# Patient Record
Sex: MALE | Race: WHITE
[De-identification: names, ages, dates, MRNs, and addresses within clinical notes are randomized per-mention and may not be internally consistent; named-entity substitution may affect disease eponyms.]

---

## 2018-04-23 ENCOUNTER — APPOINTMENT (OUTPATIENT)
Dept: FAMILY MEDICINE | Facility: CLINIC | Age: 78
End: 2018-04-23
Payer: MEDICARE

## 2018-04-23 VITALS
SYSTOLIC BLOOD PRESSURE: 102 MMHG | DIASTOLIC BLOOD PRESSURE: 60 MMHG | HEIGHT: 74 IN | HEART RATE: 90 BPM | OXYGEN SATURATION: 92 % | WEIGHT: 315 LBS | BODY MASS INDEX: 40.43 KG/M2

## 2018-04-23 DIAGNOSIS — Z00.00 ENCOUNTER FOR GENERAL ADULT MEDICAL EXAMINATION W/OUT ABNORMAL FINDINGS: ICD-10-CM

## 2018-04-23 DIAGNOSIS — Z13.6 ENCOUNTER FOR SCREENING FOR CARDIOVASCULAR DISORDERS: ICD-10-CM

## 2018-04-23 DIAGNOSIS — I87.2 VENOUS INSUFFICIENCY (CHRONIC) (PERIPHERAL): ICD-10-CM

## 2018-04-23 DIAGNOSIS — Z13.29 ENCOUNTER FOR SCREENING FOR OTHER SUSPECTED ENDOCRINE DISORDER: ICD-10-CM

## 2018-04-23 DIAGNOSIS — J44.9 CHRONIC OBSTRUCTIVE PULMONARY DISEASE, UNSPECIFIED: ICD-10-CM

## 2018-04-23 DIAGNOSIS — Z13.220 ENCOUNTER FOR SCREENING FOR LIPOID DISORDERS: ICD-10-CM

## 2018-04-23 DIAGNOSIS — Z13.228 ENCOUNTER FOR SCREENING FOR OTHER SUSPECTED ENDOCRINE DISORDER: ICD-10-CM

## 2018-04-23 DIAGNOSIS — R26.9 UNSPECIFIED ABNORMALITIES OF GAIT AND MOBILITY: ICD-10-CM

## 2018-04-23 DIAGNOSIS — Z13.0 ENCOUNTER FOR SCREENING FOR OTHER SUSPECTED ENDOCRINE DISORDER: ICD-10-CM

## 2018-04-23 PROCEDURE — 99204 OFFICE O/P NEW MOD 45 MIN: CPT | Mod: 25

## 2018-04-23 PROCEDURE — 36415 COLL VENOUS BLD VENIPUNCTURE: CPT

## 2018-04-24 LAB
25(OH)D3 SERPL-MCNC: 39.2 NG/ML
ALBUMIN SERPL ELPH-MCNC: 3.6 G/DL
ALP BLD-CCNC: 94 U/L
ALT SERPL-CCNC: 16 U/L
ANION GAP SERPL CALC-SCNC: 15 MMOL/L
AST SERPL-CCNC: 27 U/L
BASOPHILS # BLD AUTO: 0.02 K/UL
BASOPHILS NFR BLD AUTO: 0.3 %
BILIRUB SERPL-MCNC: 0.5 MG/DL
BUN SERPL-MCNC: 30 MG/DL
CALCIUM SERPL-MCNC: 10 MG/DL
CHLORIDE SERPL-SCNC: 111 MMOL/L
CHOLEST SERPL-MCNC: 136 MG/DL
CHOLEST/HDLC SERPL: 2.3 RATIO
CO2 SERPL-SCNC: 24 MMOL/L
CREAT SERPL-MCNC: 1.32 MG/DL
EOSINOPHIL # BLD AUTO: 0.13 K/UL
EOSINOPHIL NFR BLD AUTO: 2.2 %
FERRITIN SERPL-MCNC: 73 NG/ML
GLUCOSE SERPL-MCNC: 73 MG/DL
HBA1C MFR BLD HPLC: 5.8 %
HCT VFR BLD CALC: 43.5 %
HDLC SERPL-MCNC: 60 MG/DL
HGB BLD-MCNC: 13.6 G/DL
IMM GRANULOCYTES NFR BLD AUTO: 0.2 %
IRON SATN MFR SERPL: 27 %
IRON SERPL-MCNC: 76 UG/DL
LDLC SERPL CALC-MCNC: 58 MG/DL
LYMPHOCYTES # BLD AUTO: 1.32 K/UL
LYMPHOCYTES NFR BLD AUTO: 22.6 %
MAN DIFF?: NORMAL
MCHC RBC-ENTMCNC: 30.2 PG
MCHC RBC-ENTMCNC: 31.3 GM/DL
MCV RBC AUTO: 96.7 FL
MONOCYTES # BLD AUTO: 0.61 K/UL
MONOCYTES NFR BLD AUTO: 10.4 %
NEUTROPHILS # BLD AUTO: 3.75 K/UL
NEUTROPHILS NFR BLD AUTO: 64.3 %
PLATELET # BLD AUTO: 154 K/UL
POTASSIUM SERPL-SCNC: 5.6 MMOL/L
PROT SERPL-MCNC: 6.7 G/DL
RBC # BLD: 4.5 M/UL
RBC # FLD: 16.2 %
SODIUM SERPL-SCNC: 150 MMOL/L
T4 SERPL-MCNC: 5.3 UG/DL
TIBC SERPL-MCNC: 281 UG/DL
TRIGL SERPL-MCNC: 91 MG/DL
TSH SERPL-ACNC: 9.3 UIU/ML
UIBC SERPL-MCNC: 205 UG/DL
URATE SERPL-MCNC: 9.4 MG/DL
VIT B12 SERPL-MCNC: 518 PG/ML
WBC # FLD AUTO: 5.84 K/UL

## 2018-04-24 RX ORDER — CHOLECALCIFEROL (VITAMIN D3) 50 MCG
50 MCG TABLET ORAL
Refills: 0 | Status: ACTIVE | COMMUNITY

## 2018-04-24 RX ORDER — ASPIRIN 325 MG/1
325 TABLET, FILM COATED ORAL DAILY
Refills: 0 | Status: ACTIVE | COMMUNITY

## 2018-04-24 RX ORDER — SIMVASTATIN 40 MG/1
40 TABLET, FILM COATED ORAL
Refills: 0 | Status: ACTIVE | COMMUNITY

## 2018-04-24 RX ORDER — MULTIVIT-MIN/FA/LYCOPEN/LUTEIN .4-300-25
TABLET ORAL
Refills: 0 | Status: ACTIVE | COMMUNITY

## 2018-04-27 ENCOUNTER — APPOINTMENT (OUTPATIENT)
Dept: CARDIOLOGY | Facility: CLINIC | Age: 78
End: 2018-04-27
Payer: MEDICARE

## 2018-04-27 ENCOUNTER — NON-APPOINTMENT (OUTPATIENT)
Age: 78
End: 2018-04-27

## 2018-04-27 VITALS
OXYGEN SATURATION: 93 % | BODY MASS INDEX: 40.43 KG/M2 | WEIGHT: 315 LBS | DIASTOLIC BLOOD PRESSURE: 77 MMHG | HEIGHT: 74 IN | SYSTOLIC BLOOD PRESSURE: 100 MMHG | HEART RATE: 109 BPM

## 2018-04-27 DIAGNOSIS — I10 ESSENTIAL (PRIMARY) HYPERTENSION: ICD-10-CM

## 2018-04-27 DIAGNOSIS — I48.91 UNSPECIFIED ATRIAL FIBRILLATION: ICD-10-CM

## 2018-04-27 DIAGNOSIS — R60.0 LOCALIZED EDEMA: ICD-10-CM

## 2018-04-27 DIAGNOSIS — I50.9 HEART FAILURE, UNSPECIFIED: ICD-10-CM

## 2018-04-27 PROCEDURE — 93000 ELECTROCARDIOGRAM COMPLETE: CPT

## 2018-04-27 PROCEDURE — 99205 OFFICE O/P NEW HI 60 MIN: CPT | Mod: 25

## 2018-04-27 RX ORDER — TORSEMIDE 10 MG/1
10 TABLET ORAL DAILY
Qty: 90 | Refills: 3 | Status: ACTIVE | COMMUNITY
Start: 1900-01-01 | End: 1900-01-01

## 2018-04-27 RX ORDER — METOPROLOL SUCCINATE 200 MG/1
200 TABLET, EXTENDED RELEASE ORAL DAILY
Qty: 90 | Refills: 3 | Status: ACTIVE | COMMUNITY
Start: 1900-01-01 | End: 1900-01-01

## 2018-04-28 ENCOUNTER — INPATIENT (INPATIENT)
Facility: HOSPITAL | Age: 78
LOS: 6 days | End: 2018-05-05
Attending: FAMILY MEDICINE | Admitting: FAMILY MEDICINE
Payer: MEDICARE

## 2018-04-28 VITALS — WEIGHT: 315 LBS | HEIGHT: 74 IN

## 2018-04-28 DIAGNOSIS — N17.0 ACUTE KIDNEY FAILURE WITH TUBULAR NECROSIS: ICD-10-CM

## 2018-04-28 DIAGNOSIS — I50.21 ACUTE SYSTOLIC (CONGESTIVE) HEART FAILURE: ICD-10-CM

## 2018-04-28 DIAGNOSIS — N17.9 ACUTE KIDNEY FAILURE, UNSPECIFIED: ICD-10-CM

## 2018-04-28 DIAGNOSIS — E87.0 HYPEROSMOLALITY AND HYPERNATREMIA: ICD-10-CM

## 2018-04-28 DIAGNOSIS — J44.9 CHRONIC OBSTRUCTIVE PULMONARY DISEASE, UNSPECIFIED: ICD-10-CM

## 2018-04-28 DIAGNOSIS — K72.90 HEPATIC FAILURE, UNSPECIFIED WITHOUT COMA: ICD-10-CM

## 2018-04-28 DIAGNOSIS — E66.9 OBESITY, UNSPECIFIED: ICD-10-CM

## 2018-04-28 DIAGNOSIS — I25.10 ATHEROSCLEROTIC HEART DISEASE OF NATIVE CORONARY ARTERY WITHOUT ANGINA PECTORIS: ICD-10-CM

## 2018-04-28 DIAGNOSIS — I11.0 HYPERTENSIVE HEART DISEASE WITH HEART FAILURE: ICD-10-CM

## 2018-04-28 DIAGNOSIS — A41.9 SEPSIS, UNSPECIFIED ORGANISM: ICD-10-CM

## 2018-04-28 DIAGNOSIS — K72.00 ACUTE AND SUBACUTE HEPATIC FAILURE WITHOUT COMA: ICD-10-CM

## 2018-04-28 DIAGNOSIS — E83.51 HYPOCALCEMIA: ICD-10-CM

## 2018-04-28 DIAGNOSIS — E78.5 HYPERLIPIDEMIA, UNSPECIFIED: ICD-10-CM

## 2018-04-28 DIAGNOSIS — I95.9 HYPOTENSION, UNSPECIFIED: ICD-10-CM

## 2018-04-28 DIAGNOSIS — I48.2 CHRONIC ATRIAL FIBRILLATION: ICD-10-CM

## 2018-04-28 DIAGNOSIS — Z79.82 LONG TERM (CURRENT) USE OF ASPIRIN: ICD-10-CM

## 2018-04-28 DIAGNOSIS — Z87.891 PERSONAL HISTORY OF NICOTINE DEPENDENCE: ICD-10-CM

## 2018-04-28 DIAGNOSIS — E87.2 ACIDOSIS: ICD-10-CM

## 2018-04-28 DIAGNOSIS — G93.41 METABOLIC ENCEPHALOPATHY: ICD-10-CM

## 2018-04-28 DIAGNOSIS — R65.21 SEVERE SEPSIS WITH SEPTIC SHOCK: ICD-10-CM

## 2018-04-28 LAB
BASOPHILS # BLD AUTO: 0.03 K/UL — SIGNIFICANT CHANGE UP (ref 0–0.2)
BASOPHILS NFR BLD AUTO: 0.5 % — SIGNIFICANT CHANGE UP (ref 0–2)
EOSINOPHIL # BLD AUTO: 0.09 K/UL — SIGNIFICANT CHANGE UP (ref 0–0.5)
EOSINOPHIL NFR BLD AUTO: 1.6 % — SIGNIFICANT CHANGE UP (ref 0–6)
HCT VFR BLD CALC: 45.7 % — SIGNIFICANT CHANGE UP (ref 39–50)
HGB BLD-MCNC: 14.2 G/DL — SIGNIFICANT CHANGE UP (ref 13–17)
IMM GRANULOCYTES NFR BLD AUTO: 0.2 % — SIGNIFICANT CHANGE UP (ref 0–1.5)
INR BLD: 1.01 RATIO — SIGNIFICANT CHANGE UP (ref 0.88–1.16)
LYMPHOCYTES # BLD AUTO: 1.07 K/UL — SIGNIFICANT CHANGE UP (ref 1–3.3)
LYMPHOCYTES # BLD AUTO: 19 % — SIGNIFICANT CHANGE UP (ref 13–44)
MCHC RBC-ENTMCNC: 29.3 PG — SIGNIFICANT CHANGE UP (ref 27–34)
MCHC RBC-ENTMCNC: 31.1 GM/DL — LOW (ref 32–36)
MCV RBC AUTO: 94.4 FL — SIGNIFICANT CHANGE UP (ref 80–100)
MONOCYTES # BLD AUTO: 0.62 K/UL — SIGNIFICANT CHANGE UP (ref 0–0.9)
MONOCYTES NFR BLD AUTO: 11 % — SIGNIFICANT CHANGE UP (ref 2–14)
NEUTROPHILS # BLD AUTO: 3.82 K/UL — SIGNIFICANT CHANGE UP (ref 1.8–7.4)
NEUTROPHILS NFR BLD AUTO: 67.7 % — SIGNIFICANT CHANGE UP (ref 43–77)
NRBC # BLD: 0 /100 WBCS — SIGNIFICANT CHANGE UP (ref 0–0)
PLATELET # BLD AUTO: 147 K/UL — LOW (ref 150–400)
PROTHROM AB SERPL-ACNC: 10.9 SEC — SIGNIFICANT CHANGE UP (ref 9.8–12.7)
RBC # BLD: 4.84 M/UL — SIGNIFICANT CHANGE UP (ref 4.2–5.8)
RBC # FLD: 15.4 % — HIGH (ref 10.3–14.5)
TROPONIN I SERPL-MCNC: <0.015 NG/ML — SIGNIFICANT CHANGE UP (ref 0.01–0.04)
WBC # BLD: 5.64 K/UL — SIGNIFICANT CHANGE UP (ref 3.8–10.5)
WBC # FLD AUTO: 5.64 K/UL — SIGNIFICANT CHANGE UP (ref 3.8–10.5)

## 2018-04-28 PROCEDURE — 71045 X-RAY EXAM CHEST 1 VIEW: CPT | Mod: 26

## 2018-04-28 PROCEDURE — 99291 CRITICAL CARE FIRST HOUR: CPT

## 2018-04-28 PROCEDURE — 93010 ELECTROCARDIOGRAM REPORT: CPT

## 2018-04-28 RX ORDER — METOPROLOL TARTRATE 50 MG
5 TABLET ORAL ONCE
Qty: 0 | Refills: 0 | Status: COMPLETED | OUTPATIENT
Start: 2018-04-28 | End: 2018-04-28

## 2018-04-28 RX ORDER — NITROGLYCERIN 6.5 MG
0.5 CAPSULE, EXTENDED RELEASE ORAL ONCE
Qty: 0 | Refills: 0 | Status: COMPLETED | OUTPATIENT
Start: 2018-04-28 | End: 2018-04-28

## 2018-04-28 RX ORDER — FUROSEMIDE 40 MG
40 TABLET ORAL ONCE
Qty: 0 | Refills: 0 | Status: COMPLETED | OUTPATIENT
Start: 2018-04-28 | End: 2018-04-28

## 2018-04-28 RX ADMIN — Medication 125 MILLIGRAM(S): at 22:11

## 2018-04-28 RX ADMIN — Medication 5 MILLIGRAM(S): at 22:11

## 2018-04-28 NOTE — ED STATDOCS - PROGRESS NOTE DETAILS
Cristian ANDERSON for ED attending, Dr. Wilder: 78 y/o F with a PMHx of afib presents to the ED c/o SOB xmonths, worsened this week. +b/l leg swelling. No CP. On ASA 325mg daily. Will send pt to the main ED for further evaluation.

## 2018-04-28 NOTE — ED PROVIDER NOTE - CARE PLAN
Principal Discharge DX:	CHF (congestive heart failure), NYHA class I, acute, systolic  Secondary Diagnosis:	Rapid atrial fibrillation

## 2018-04-28 NOTE — ED PROVIDER NOTE - MUSCULOSKELETAL, MLM
Spine appears normal, range of motion is not limited, no muscle or joint tenderness 2+ pitting edema, erryhtem and scaleySpine appears normal, range of motion is not limited, no muscle or joint tenderness 2+ pitting edema, erythema and scalenes to b/l LE's. Spine appears normal, range of motion is not limited, no muscle or joint tenderness

## 2018-04-28 NOTE — ED ADULT NURSE NOTE - OBJECTIVE STATEMENT
Pt presents to the ED with complaints of worsening shortness of breath. Pt was seen by his cardiologist and told to come to the ED. Pt stopped taking Furosemide a few weeks ago as he felt he was having bladder issues. Pt states that his is unable to ambulate without exertion. Pt has a h/o afib as well. Pt has increased edema to the lower extremities bilaterally.

## 2018-04-28 NOTE — ED PROVIDER NOTE - OBJECTIVE STATEMENT
76 y/o male w. PMHx of ?CHF,?COPD,afib, HTN, presents to the ED c/o. Report progressively worsening SOB over the past couple of weeks +fatigue +b/l leg swelling/weakness + brain fogginess. Pt is on at home nebulizer ,states provides no relief. Saw cardiologist  yesterday, which prompted him to come into ED. Denies cough, fever, CP or any other acute medical complaints at this time.  Pt is on daily ASA. 78 y/o male w. PMHx of ?CHF,?COPD, Afib, HTN, presents to the ED c/o. Report progressively worsening SOB over the past couple of weeks +fatigue +b/l leg swelling/weakness + brain fogginess. Pt is on at home nebulizer ,states provides no relief. Saw cardiologist  yesterday, which prompted him to come into ED. Denies cough, fever, CP or any other acute medical complaints at this time.  Pt is on daily ASA.

## 2018-04-28 NOTE — ED PROVIDER NOTE - CRITICAL CARE PROVIDED
direct patient care (not related to procedure)/consult w/ pt's family directly relating to pts condition/interpretation of diagnostic studies/consultation with other physicians

## 2018-04-28 NOTE — ED ADULT TRIAGE NOTE - CHIEF COMPLAINT QUOTE
Pt. to the ED BIBA C/O SOB- As per family , pt. was sent to the ED by cardiologist for evaluation of possible HF- Pt. denies CP at this time-

## 2018-04-29 LAB
NT-PROBNP SERPL-SCNC: 5002 PG/ML — HIGH (ref 0–450)
TROPONIN I SERPL-MCNC: <0.015 NG/ML — SIGNIFICANT CHANGE UP (ref 0.01–0.04)

## 2018-04-29 RX ORDER — METOPROLOL TARTRATE 50 MG
50 TABLET ORAL
Qty: 0 | Refills: 0 | Status: DISCONTINUED | OUTPATIENT
Start: 2018-04-29 | End: 2018-05-01

## 2018-04-29 RX ORDER — SODIUM CHLORIDE 9 MG/ML
250 INJECTION INTRAMUSCULAR; INTRAVENOUS; SUBCUTANEOUS ONCE
Qty: 0 | Refills: 0 | Status: COMPLETED | OUTPATIENT
Start: 2018-04-29 | End: 2018-04-29

## 2018-04-29 RX ORDER — ENOXAPARIN SODIUM 100 MG/ML
40 INJECTION SUBCUTANEOUS EVERY 24 HOURS
Qty: 0 | Refills: 0 | Status: DISCONTINUED | OUTPATIENT
Start: 2018-04-29 | End: 2018-05-02

## 2018-04-29 RX ORDER — FUROSEMIDE 40 MG
40 TABLET ORAL
Qty: 0 | Refills: 0 | Status: DISCONTINUED | OUTPATIENT
Start: 2018-04-29 | End: 2018-05-01

## 2018-04-29 RX ORDER — ASPIRIN/CALCIUM CARB/MAGNESIUM 324 MG
325 TABLET ORAL DAILY
Qty: 0 | Refills: 0 | Status: DISCONTINUED | OUTPATIENT
Start: 2018-04-29 | End: 2018-05-05

## 2018-04-29 RX ORDER — SIMVASTATIN 20 MG/1
40 TABLET, FILM COATED ORAL AT BEDTIME
Qty: 0 | Refills: 0 | Status: DISCONTINUED | OUTPATIENT
Start: 2018-04-29 | End: 2018-05-05

## 2018-04-29 RX ADMIN — Medication 40 MILLIGRAM(S): at 17:43

## 2018-04-29 RX ADMIN — Medication 40 MILLIGRAM(S): at 11:41

## 2018-04-29 RX ADMIN — Medication 325 MILLIGRAM(S): at 11:42

## 2018-04-29 RX ADMIN — Medication 50 MILLIGRAM(S): at 17:33

## 2018-04-29 RX ADMIN — ENOXAPARIN SODIUM 40 MILLIGRAM(S): 100 INJECTION SUBCUTANEOUS at 12:00

## 2018-04-29 RX ADMIN — SODIUM CHLORIDE 250 MILLILITER(S): 9 INJECTION INTRAMUSCULAR; INTRAVENOUS; SUBCUTANEOUS at 00:54

## 2018-04-29 RX ADMIN — Medication 0.5 INCH(S): at 00:08

## 2018-04-29 RX ADMIN — SIMVASTATIN 40 MILLIGRAM(S): 20 TABLET, FILM COATED ORAL at 21:27

## 2018-04-29 NOTE — H&P ADULT - ASSESSMENT
76 y/o male w. PMHx of ?CHF, COPD, chr Afib not on AC due to GI bleed and hemoptysis, HTN, presented to the ED with progressively worsening SOB over the past couple of weeks. As per patient, his SOB progressively worsening and gradually gaining weight with leg swelling. He gained almost 30 lbs over couple of weeks. He denies any chest pain, palpitation, nausea, vomiting, abd pain.   He received 40 mg IV lasix, 125 mg IV solumedrol and 5 mg IV lasix in ED.      1. Acute CHF-unknown EF  Admit to tele  Daily weight, fluid restrictions  Strict I/Os  Continue IV lasix  Check 2D echo  Consult cardiology-Dr Castillo.    2. Chr A Fib with RVR  Continue lopressor and aspirin  Patient was on xarelto in the past, off now due to GI bleed and hemoptysis.    3. HTN-  Continue lopressor    4. Hyperlipidmeia-  Continue  statin    5. Hypernatremia-  Follow BMP    6. DVT prophylaxis.

## 2018-04-29 NOTE — ED ADULT NURSE REASSESSMENT NOTE - NS ED NURSE REASSESS COMMENT FT1
Pt and family updated in regards to plan for admission, admission rationale and process. Pt and family verbalize understanding of information. Pt depends changed and pt repositioned for comfort. Pt remains on bedside monitor at this time with call bell within reach. Pt and family have no questions at this time, awaiting hospitalist evaluation and admission orders. Will continue to monitor.
Pt and family updated in regards to plan of care and medication information and rationale. Pt and family verbalized understanding of information and have no questions at this time. Pt remains on bedside monitor with call bell within reach. Pt and family updated in regards to results at this time and verbalized understanding of information. Will continue to monitor.
Patient received from CARIN Meyer. patient resting comfortably in bed. Safety and comfort maintained. Will continue to monitor.

## 2018-04-29 NOTE — H&P ADULT - NSHPLABSRESULTS_GEN_ALL_CORE
EKG: A fib with RVR                          14.2   5.64  )-----------( 147      ( 28 Apr 2018 19:58 )             45.7     28 Apr 2018 19:58    148    |  114    |  28     ----------------------------<  80     4.5     |  25     |  1.25     Ca    9.6        28 Apr 2018 19:58  Mg     2.3       28 Apr 2018 19:58    TPro  7.1    /  Alb  3.2    /  TBili  0.6    /  DBili  x      /  AST  26     /  ALT  22     /  AlkPhos  104    28 Apr 2018 19:58    LIVER FUNCTIONS - ( 28 Apr 2018 19:58 )  Alb: 3.2 g/dL / Pro: 7.1 gm/dL / ALK PHOS: 104 U/L / ALT: 22 U/L / AST: 26 U/L / GGT: x           PT/INR - ( 28 Apr 2018 19:58 )   PT: 10.9 sec;   INR: 1.01 ratio           CAPILLARY BLOOD GLUCOSE        CARDIAC MARKERS ( 28 Apr 2018 23:33 )  <0.015 ng/mL / x     / x     / x     / x      CARDIAC MARKERS ( 28 Apr 2018 19:58 )  <0.015 ng/mL / x     / x     / x     / x

## 2018-04-29 NOTE — H&P ADULT - NSHPPHYSICALEXAM_GEN_ALL_CORE
Vital Signs Last 24 Hrs  T(C): 36.6 (29 Apr 2018 06:11), Max: 36.8 (29 Apr 2018 00:15)  T(F): 97.9 (29 Apr 2018 06:11), Max: 98.3 (29 Apr 2018 00:15)  HR: 96 (29 Apr 2018 02:20) (96 - 128)  BP: 112/83 (29 Apr 2018 06:11) (93/79 - 134/93)  BP(mean): --  RR: 20 (29 Apr 2018 06:11) (15 - 20)  SpO2: 95% (29 Apr 2018 06:11) (94% - 97%)          · CONSTITUTIONAL: Well appearing, well nourished, awake, alert, oriented to person, place, time/situation and in no apparent distress.  · ENMT: Airway patent, Nasal mucosa clear. Mouth with normal mucosa. Throat has no vesicles, no oropharyngeal exudates and uvula is midline.  · EYES: Clear bilaterally, pupils equal, round and reactive to light.  · CARDIAC: +JVD, tachycardic, rapid, irregular  · RESPIRATORY: b/l rhonchi.  · GASTROINTESTINAL: Abdomen soft, non-tender, no guarding.  · MUSCULOSKELETAL: 2+ pitting edema, erythema and scalenes to b/l LE's. Spine appears normal, range of motion is not limited, no muscle or joint tenderness  · NEUROLOGICAL: Alert and oriented, no focal deficits, no motor or sensory deficits.  · SKIN: Skin normal color for race, warm, dry and intact. No evidence of rash.  · PSYCHIATRIC: Alert and oriented to person, place, time/situation. normal mood and affect. no apparent risk to self or others.  · HEME LYMPH: No adenopathy or splenomegaly. No cervical or inguinal lymphadenopathy.

## 2018-04-29 NOTE — H&P ADULT - HISTORY OF PRESENT ILLNESS
76 y/o male w. PMHx of ?CHF,?COPD, Afib, HTN, presents to the ED c/o. Report progressively worsening SOB over the past couple of weeks +fatigue +b/l leg swelling/weakness + brain fogginess. Pt is on at home nebulizer ,states provides no relief. Saw cardiologist  yesterday, which prompted him to come into ED. Denies cough, fever, CP                     76 y/o male w. PMHx of ?CHF, COPD, chr Afib not on AC due to GI bleed and hemoptysis, HTN, presented to the ED with progressively worsening SOB over the past couple of weeks. As per patient, his SOB progressively worsening and gradually gaining weight with leg swelling. He gained almost 30 lbs over couple of weeks. He denies any chest pain, palpitation, nausea, vomiting, abd pain.   He received 40 mg IV lasix, 125 mg IV solumedrol and 5 mg IV lasix in ED. 78 y/o male w. PMHx of ?CHF, COPD, chr Afib not on AC due to GI bleed and hemoptysis, HTN, presented to the ED with progressively worsening SOB over the past couple of weeks. As per patient, his SOB progressively worsening and gradually gaining weight with leg swelling. He gained almost 30 lbs over couple of weeks. He denies any chest pain, palpitation, nausea, vomiting, abd pain.   He received 40 mg IV lasix, 125 mg IV solumedrol and 5 mg IV lasix in ED.

## 2018-04-30 LAB
ANION GAP SERPL CALC-SCNC: 8 MMOL/L — SIGNIFICANT CHANGE UP (ref 5–17)
BUN SERPL-MCNC: 31 MG/DL — HIGH (ref 7–23)
CALCIUM SERPL-MCNC: 8.9 MG/DL — SIGNIFICANT CHANGE UP (ref 8.5–10.1)
CHLORIDE SERPL-SCNC: 114 MMOL/L — HIGH (ref 96–108)
CO2 SERPL-SCNC: 26 MMOL/L — SIGNIFICANT CHANGE UP (ref 22–31)
CREAT SERPL-MCNC: 1.31 MG/DL — HIGH (ref 0.5–1.3)
GLUCOSE SERPL-MCNC: 108 MG/DL — HIGH (ref 70–99)
HCT VFR BLD CALC: 41.5 % — SIGNIFICANT CHANGE UP (ref 39–50)
HGB BLD-MCNC: 13.1 G/DL — SIGNIFICANT CHANGE UP (ref 13–17)
MCHC RBC-ENTMCNC: 29.8 PG — SIGNIFICANT CHANGE UP (ref 27–34)
MCHC RBC-ENTMCNC: 31.6 GM/DL — LOW (ref 32–36)
MCV RBC AUTO: 94.5 FL — SIGNIFICANT CHANGE UP (ref 80–100)
NRBC # BLD: 0 /100 WBCS — SIGNIFICANT CHANGE UP (ref 0–0)
PLATELET # BLD AUTO: 135 K/UL — LOW (ref 150–400)
POTASSIUM SERPL-MCNC: 4 MMOL/L — SIGNIFICANT CHANGE UP (ref 3.5–5.3)
POTASSIUM SERPL-SCNC: 4 MMOL/L — SIGNIFICANT CHANGE UP (ref 3.5–5.3)
RBC # BLD: 4.39 M/UL — SIGNIFICANT CHANGE UP (ref 4.2–5.8)
RBC # FLD: 15.6 % — HIGH (ref 10.3–14.5)
SODIUM SERPL-SCNC: 148 MMOL/L — HIGH (ref 135–145)
WBC # BLD: 9.02 K/UL — SIGNIFICANT CHANGE UP (ref 3.8–10.5)
WBC # FLD AUTO: 9.02 K/UL — SIGNIFICANT CHANGE UP (ref 3.8–10.5)

## 2018-04-30 PROCEDURE — 93306 TTE W/DOPPLER COMPLETE: CPT | Mod: 26

## 2018-04-30 RX ADMIN — Medication 50 MILLIGRAM(S): at 06:20

## 2018-04-30 RX ADMIN — SIMVASTATIN 40 MILLIGRAM(S): 20 TABLET, FILM COATED ORAL at 21:36

## 2018-04-30 RX ADMIN — Medication 40 MILLIGRAM(S): at 18:10

## 2018-04-30 RX ADMIN — Medication 325 MILLIGRAM(S): at 11:23

## 2018-04-30 RX ADMIN — Medication 40 MILLIGRAM(S): at 06:20

## 2018-04-30 RX ADMIN — ENOXAPARIN SODIUM 40 MILLIGRAM(S): 100 INJECTION SUBCUTANEOUS at 11:23

## 2018-04-30 RX ADMIN — Medication 50 MILLIGRAM(S): at 18:10

## 2018-04-30 NOTE — DIETITIAN INITIAL EVALUATION ADULT. - OTHER INFO
Nutrition Consult for CHF: 77yoM admitted for acute CHF exacerbation, hypernatremia. PMH includes chronic Afib, HTN, HLD. Pt reports no change in appetite, po intake meeting >80% estimated nutrition needs. No c/o n/v/d/c. NKFA. Pt receiving low sodium diet rx. Diet recall reflects excess sodium intake, +high fat foods. No therapeutic diet restrictions followed. +25# (8.3%) wt gain x6mo. likely almost entirely due to fluid retention. BMI 41.9, categorized as obese. d/w pt low sodium guideline, fluid restriction, monitoring wt daily. Patient consumes mostly prepared foods, take-out, son does the shopping. Provided recommendations for low sodium food products. Education materials provided. 3+ edema to R/L legs noted. Rajinder 14. Recommendations: 1) Change diet to DASH/TLC 2) Monitor weight daily 3) Add MVI/minerals to ensure 100% RDI met. Will continue to monitor po intake, wt, labs.

## 2018-04-30 NOTE — CONSULT NOTE ADULT - ASSESSMENT
76 y/o male w. PMHx of ?CHF, COPD, chr Afib not on AC due to GI bleed and hemoptysis, HTN, presented to the ED with progressively worsening SOB over the past couple of weeks. As per patient, his SOB progressively worsening and gradually gaining weight with leg swelling. He gained almost 30 lbs over couple of weeks.     1. Acute CHF exacerbation- pt is a poor historian, unknown LVFx now. Check 2Decho. Would reduce lasix to 40mg daily in light of rising Cr. Repeat CXR tomorrow AM. Daily weights, fluid restrictions  Strict I/Os. Likely poor dietary compliance as well. Cont Bbl. Trops negative. Outpt ischemic eval.    2. Chronic atrial fibrillation- presently rate controlled on tele. Cont Bbl- will titrate as needed. As per hospitalist note, patient was on xarelto in the past, off now due to GI bleed and hemoptysis.  Can continue ASA for some CVA proph.    3. HTN- controlled, cont current meds.    4. Dyslipidemia- cont statin, lipid panel pending.     5. DVT proph, OOB as tolerated.     6. Will need close outpt f/u upon discharge.

## 2018-04-30 NOTE — CONSULT NOTE ADULT - SUBJECTIVE AND OBJECTIVE BOX
Cardiology Consultation    HPI: 78 y/o male w. PMHx of ?CHF, COPD, chr Afib not on AC due to GI bleed and hemoptysis, HTN, presented to the ED with progressively worsening SOB over the past couple of weeks. As per patient, his SOB progressively worsening and gradually gaining weight with leg swelling. He gained almost 30 lbs over couple of weeks. He denies any chest pain, palpitation, nausea, vomiting, abd pain.   He received 40 mg IV lasix, 125 mg IV solumedrol and 5 mg IV lasix in ED. (29 Apr 2018 13:11)    4/30- SOB improved. Afib on tele- . No CP. No fevers.    PAST MEDICAL & SURGICAL HISTORY:  Chronic atrial fibrillation  HTN (hypertension), benign  No significant past surgical history    Allergies  No Known Allergies    SOCIAL HISTORY: Denies tobacco, etoh abuse or illicit drug use    FAMILY HISTORY: No pertinent family history in first degree relatives    MEDICATIONS  (STANDING):  aspirin 325 milliGRAM(s) Oral daily  enoxaparin Injectable 40 milliGRAM(s) SubCutaneous every 24 hours  furosemide   Injectable 40 milliGRAM(s) IV Push two times a day  metoprolol tartrate 50 milliGRAM(s) Oral two times a day  simvastatin 40 milliGRAM(s) Oral at bedtime    MEDICATIONS  (PRN):      Vital Signs Last 24 Hrs  T(C): 37.1 (30 Apr 2018 06:14), Max: 37.1 (30 Apr 2018 06:14)  T(F): 98.7 (30 Apr 2018 06:14), Max: 98.7 (30 Apr 2018 06:14)  HR: 86 (30 Apr 2018 06:14) (86 - 110)  BP: 115/84 (30 Apr 2018 06:14) (94/78 - 115/84)  BP(mean): --  RR: 18 (30 Apr 2018 06:14) (15 - 18)  SpO2: 94% (30 Apr 2018 06:14) (94% - 100%)    REVIEW OF SYSTEMS:    CONSTITUTIONAL:  As per HPI.  HEENT:  Eyes:  No diplopia or blurred vision. ENT:  No earache, sore throat or runny nose.  CARDIOVASCULAR:  No pressure, squeezing, strangling, tightness, heaviness or aching about the chest, neck, axilla or epigastrium.  RESPIRATORY: +SOB, DARLING  GASTROINTESTINAL:  No nausea, vomiting or diarrhea.  GENITOURINARY:  No dysuria, frequency or urgency.  MUSCULOSKELETAL:  As per HPI.  SKIN:  No change in skin, hair or nails.  NEUROLOGIC:  No paresthesias, fasciculations, seizures or weakness.    PHYSICAL EXAMINATION:    GENERAL APPEARANCE:  Pt. is not currently dyspneic, in no distress. Pt. is alert, oriented, and pleasant.  HEENT:  Pupils are normal and react normally. No icterus. Mucous membranes well colored.  NECK:  Supple. No lymphadenopathy. Jugular venous pressure not elevated. Carotids equal.   HEART:   The cardiac impulse has a normal quality. There are no murmurs, rubs or gallops noted, irregular rhythm  CHEST:  Decreased BS b/l  ABDOMEN:  Soft and nontender.   EXTREMITIES: 2-3+ LE edema.  SKIN:  b/l LE venous stasis changes.    I&O's Summary      LABS:                        13.1   9.02  )-----------( x        ( 30 Apr 2018 06:41 )             41.5     04-30    148<H>  |  114<H>  |  31<H>  ----------------------------<  108<H>  4.0   |  26  |  1.31<H>    Ca    8.9      30 Apr 2018 06:41  Mg     2.3     04-28    TPro  7.1  /  Alb  3.2<L>  /  TBili  0.6  /  DBili  x   /  AST  26  /  ALT  22  /  AlkPhos  104  04-28    LIVER FUNCTIONS - ( 28 Apr 2018 19:58 )  Alb: 3.2 g/dL / Pro: 7.1 gm/dL / ALK PHOS: 104 U/L / ALT: 22 U/L / AST: 26 U/L / GGT: x           PT/INR - ( 28 Apr 2018 19:58 )   PT: 10.9 sec;   INR: 1.01 ratio      CARDIAC MARKERS ( 28 Apr 2018 23:33 )  <0.015 ng/mL / x     / x     / x     / x      CARDIAC MARKERS ( 28 Apr 2018 19:58 )  <0.015 ng/mL / x     / x     / x     / x        EKG: < from: 12 Lead ECG (04.28.18 @ 19:31) >  Atrial fibrillation with rapid ventricular response with premature ventricular or aberrantly conducted complexes  Left axis deviation  Non-specific intra-ventricular conduction delay  Marked ST abnormality, possible lateral subendocardial injury    TELEMETRY: Afib     CARDIAC TESTS: pending    RADIOLOGY & ADDITIONAL STUDIES: < from: Xray Chest 1 View- PORTABLE-Routine (04.28.18 @ 20:31) >    Small bilateral pleural effusions with bibasilar atelectasis and or   pneumonia.    ASSESSMENT & PLAN:

## 2018-04-30 NOTE — DIETITIAN INITIAL EVALUATION ADULT. - ENERGY NEEDS
Ht.      72"        Wt.    325.8#             41.9 BMI                  IBW    178#               Pt is at    183%  IBW

## 2018-05-01 DIAGNOSIS — I50.21 ACUTE SYSTOLIC (CONGESTIVE) HEART FAILURE: ICD-10-CM

## 2018-05-01 DIAGNOSIS — I48.2 CHRONIC ATRIAL FIBRILLATION: ICD-10-CM

## 2018-05-01 LAB
ANION GAP SERPL CALC-SCNC: 3 MMOL/L — LOW (ref 5–17)
BUN SERPL-MCNC: 36 MG/DL — HIGH (ref 7–23)
CALCIUM SERPL-MCNC: 9.1 MG/DL — SIGNIFICANT CHANGE UP (ref 8.5–10.1)
CHLORIDE SERPL-SCNC: 110 MMOL/L — HIGH (ref 96–108)
CO2 SERPL-SCNC: 32 MMOL/L — HIGH (ref 22–31)
CREAT SERPL-MCNC: 1.41 MG/DL — HIGH (ref 0.5–1.3)
GLUCOSE SERPL-MCNC: 73 MG/DL — SIGNIFICANT CHANGE UP (ref 70–99)
HCT VFR BLD CALC: 42.4 % — SIGNIFICANT CHANGE UP (ref 39–50)
HGB BLD-MCNC: 13.2 G/DL — SIGNIFICANT CHANGE UP (ref 13–17)
MCHC RBC-ENTMCNC: 29.9 PG — SIGNIFICANT CHANGE UP (ref 27–34)
MCHC RBC-ENTMCNC: 31.1 GM/DL — LOW (ref 32–36)
MCV RBC AUTO: 96.1 FL — SIGNIFICANT CHANGE UP (ref 80–100)
NRBC # BLD: 0 /100 WBCS — SIGNIFICANT CHANGE UP (ref 0–0)
PLATELET # BLD AUTO: 126 K/UL — LOW (ref 150–400)
POTASSIUM SERPL-MCNC: 4.1 MMOL/L — SIGNIFICANT CHANGE UP (ref 3.5–5.3)
POTASSIUM SERPL-SCNC: 4.1 MMOL/L — SIGNIFICANT CHANGE UP (ref 3.5–5.3)
RBC # BLD: 4.41 M/UL — SIGNIFICANT CHANGE UP (ref 4.2–5.8)
RBC # FLD: 15.3 % — HIGH (ref 10.3–14.5)
SODIUM SERPL-SCNC: 145 MMOL/L — SIGNIFICANT CHANGE UP (ref 135–145)
WBC # BLD: 7.57 K/UL — SIGNIFICANT CHANGE UP (ref 3.8–10.5)
WBC # FLD AUTO: 7.57 K/UL — SIGNIFICANT CHANGE UP (ref 3.8–10.5)

## 2018-05-01 RX ORDER — FUROSEMIDE 40 MG
40 TABLET ORAL DAILY
Qty: 0 | Refills: 0 | Status: DISCONTINUED | OUTPATIENT
Start: 2018-05-02 | End: 2018-05-04

## 2018-05-01 RX ORDER — METOPROLOL TARTRATE 50 MG
75 TABLET ORAL DAILY
Qty: 0 | Refills: 0 | Status: DISCONTINUED | OUTPATIENT
Start: 2018-05-01 | End: 2018-05-05

## 2018-05-01 RX ADMIN — Medication 50 MILLIGRAM(S): at 07:34

## 2018-05-01 RX ADMIN — Medication 40 MILLIGRAM(S): at 07:34

## 2018-05-01 RX ADMIN — SIMVASTATIN 40 MILLIGRAM(S): 20 TABLET, FILM COATED ORAL at 22:23

## 2018-05-01 RX ADMIN — ENOXAPARIN SODIUM 40 MILLIGRAM(S): 100 INJECTION SUBCUTANEOUS at 12:15

## 2018-05-01 RX ADMIN — Medication 325 MILLIGRAM(S): at 12:15

## 2018-05-01 NOTE — CONSULT NOTE ADULT - ASSESSMENT
ASSESSMENT & PLAN ASSESSMENT & PLAN:  77 year old male admitted with SOB, EF 35-40% (ECHO this admission) ASSESSMENT:  77 year old male admitted with SOB, EF 35-40% (ECHO this admission) who is in chronic At. fib (nor on OAC due to past history of GI Bleed)  Plan:   Pulmonary Consult   Continue to Monitor Pt  Maintain normal lytes and Mg level  Dr. Rivera to further evalutes ASSESSMENT:  77 year old male admitted with SOB and mixed systolic and Diastolic Heart Failure, EF 35-40% (ECHO this admission) who is in chronic At. fib (nor on OAC due to past history of GI Bleed)    Plan:   Pulmonary Consult   Heart Rate Control  Continue to Monitor Pt  Maintain normal lytes and Mg level  Dr. Rivera to further evaluate  Observe and Monitor Cr Clearance ASSESSMENT:  77 year old male admitted with SOB and mixed systolic and Diastolic Heart Failure, EF 35-40% (ECHO this admission) who is in chronic At. fib (nor on OAC due to past history of GI Bleed)

## 2018-05-01 NOTE — PHYSICAL THERAPY INITIAL EVALUATION ADULT - GAIT DISTANCE, PT EVAL
50 feet/x2 ,interim seated rest for ""feeling groggy" ,was incontinent of moderate volume of urine while seated in chair without warning

## 2018-05-01 NOTE — PHYSICAL THERAPY INITIAL EVALUATION ADULT - PLANNED THERAPY INTERVENTIONS, PT EVAL
strengthening/transfer training/ROM/gait training/bed mobility training/stair training ; elevation BLEs,wound/skin care B legs

## 2018-05-01 NOTE — PHYSICAL THERAPY INITIAL EVALUATION ADULT - CRITERIA FOR SKILLED THERAPEUTIC INTERVENTIONS
therapy frequency/anticipated discharge recommendation/impairments found/predicted duration of therapy intervention/anticipated equipment needs at discharge/risk reduction/prevention/functional limitations in following categories/rehab potential

## 2018-05-01 NOTE — PHYSICAL THERAPY INITIAL EVALUATION ADULT - SKIN COLOR/CHARACTERISTICS
cellulitis R anterior lower leg ,chronic venous statsis changes bilaterally with marked pitting edema BLEs zac. below knees

## 2018-05-01 NOTE — CONSULT NOTE ADULT - SUBJECTIVE AND OBJECTIVE BOX
HPI:  76 y/o male who presented to the ED for increased SOB.  Review of previous records here at  shows no prior admissions for SOB  He received 40 mg IV lasix, 125 mg IV  and solumedrol and 5 mg IV lasix in ED.        PAST MEDICAL & SURGICAL HISTORY:  Chronic atrial fibrillation (Not on OAC due to past history of GI Bleed)  HTN (hypertension), benign  No significant past surgical history      MEDICATIONS  (STANDING):  aspirin 325 milliGRAM(s) Oral daily  enoxaparin Injectable 40 milliGRAM(s) SubCutaneous every 24 hours  furosemide   Injectable 40 milliGRAM(s) IV Push two times a day  metoprolol tartrate 50 milliGRAM(s) Oral two times a day  simvastatin 40 milliGRAM(s) Oral at bedtime      Allergies:  No Known Allergies    SOCIAL HISTORY: Denies tobacco, etoh abuse or illicit drug use    FAMILY HISTORY: No pertinent family history in first degree relatives      Vital Signs Last 24 Hrs  T(C): 36.7 (01 May 2018 05:23), Max: 36.7 (01 May 2018 05:23)  T(F): 98.1 (01 May 2018 05:23), Max: 98.1 (01 May 2018 05:23)  HR: 99 (01 May 2018 07:30) (65 - 104)  BP: 104/69 (01 May 2018 07:30) (97/75 - 104/74)  RR: 16 (01 May 2018 07:30) (16 - 18)  SpO2: 95% (01 May 2018 07:30) (92% - 100%)    REVIEW OF SYSTEMS:    CONSTITUTIONAL:  As per HPI.  HEENT:  Eyes:  No diplopia or blurred vision. ENT:  No earache, sore throat or runny nose.  CARDIOVASCULAR:  No pressure, squeezing, strangling, tightness, heaviness or aching about the chest, neck, axilla or epigastrium.  RESPIRATORY:  No cough, shortness of breath, PND or orthopnea.  GASTROINTESTINAL:  No nausea, vomiting or diarrhea.  GENITOURINARY:  No dysuria, frequency or urgency.  MUSCULOSKELETAL:  As per HPI.  SKIN:  No change in skin, hair or nails.  NEUROLOGIC:  No paresthesias, fasciculations, seizures or weakness.  PSYCHIATRIC:  No disorder of thought or mood.  ENDOCRINE:  No heat or cold intolerance, polyuria or polydipsia.  HEMATOLOGICAL:  No easy bruising or bleedings:  .     PHYSICAL EXAMINATION:    GENERAL APPEARANCE:  Pt. is not currently dyspneic, in no distress. Pt. is alert, oriented, and pleasant.  HEENT:  Pupils are normal and react normally. No icterus. Mucous membranes well colored.  NECK:  Supple. No lymphadenopathy. Jugular venous pressure not elevated. Carotids equal.   HEART:   The cardiac impulse has a normal quality. There are no murmurs, rubs or gallops noted  CHEST:  Chest is clear to auscultation. Normal respiratory effort.  ABDOMEN:  Soft and nontender.   EXTREMITIES:  There is no edema.   SKIN:  No rash or significant lesions are noted.    I&O's Summary    01 May 2018 07:01  -  01 May 2018 09:31  --------------------------------------------------------  IN: 480 mL / OUT: 0 mL / NET: 480 mL        LABS:                        13.2   7.57  )-----------( 126      ( 01 May 2018 06:45 )             42.4     05-01    145  |  110<H>  |  36<H>  ----------------------------<  73  4.1   |  32<H>  |  1.41<H>    Ca    9.1      01 May 2018 06:45                    EKG: At. Flutter with varying block, VR 99 BPM    TELEMETRY: At. Fib with  BPM, no pauses noted    CARDIAC TESTS:    RADIOLOGY & ADDITIONAL STUDIES:    ASSESSMENT & PLAN: HPI:  78 y/o male who presented to the ED for increased SOB.  Review of previous records here at  shows no prior admissions for SOB  He received 40 mg IV lasix, 125 mg IV  and solumedrol and 5 mg IV lasix in ED.        PAST MEDICAL & SURGICAL HISTORY:  Chronic atrial fibrillation (Not on OAC due to past history of GI Bleed)  HTN (hypertension), benign  No significant past surgical history      MEDICATIONS  (STANDING):  aspirin 325 milliGRAM(s) Oral daily  enoxaparin Injectable 40 milliGRAM(s) SubCutaneous every 24 hours  furosemide   Injectable 40 milliGRAM(s) IV Push two times a day  metoprolol tartrate 50 milliGRAM(s) Oral two times a day  simvastatin 40 milliGRAM(s) Oral at bedtime      Allergies:  No Known Allergies    SOCIAL HISTORY: Denies tobacco, etoh abuse or illicit drug use    FAMILY HISTORY: No pertinent family history in first degree relatives      Vital Signs Last 24 Hrs  T(C): 36.7 (01 May 2018 05:23), Max: 36.7 (01 May 2018 05:23)  T(F): 98.1 (01 May 2018 05:23), Max: 98.1 (01 May 2018 05:23)  HR: 99 (01 May 2018 07:30) (65 - 104)  BP: 104/69 (01 May 2018 07:30) (97/75 - 104/74)  RR: 16 (01 May 2018 07:30) (16 - 18)  SpO2: 95% (01 May 2018 07:30) (92% - 100%)    REVIEW OF SYSTEMS:    CONSTITUTIONAL:  As per HPI.  HEENT:  Eyes:  No diplopia or blurred vision. ENT:  No earache, sore throat or runny nose.  CARDIOVASCULAR:  No pressure, squeezing, strangling, tightness, heaviness or aching about the chest, neck, axilla or epigastrium.  RESPIRATORY:  No cough, shortness of breath, PND or orthopnea.  GASTROINTESTINAL:  No nausea, vomiting or diarrhea.  GENITOURINARY:  No dysuria, frequency or urgency.  MUSCULOSKELETAL:  As per HPI.  SKIN:  No change in skin, hair or nails.  NEUROLOGIC:  No paresthesias, fasciculations, seizures or weakness.  PSYCHIATRIC:  No disorder of thought or mood.  ENDOCRINE:  No heat or cold intolerance, polyuria or polydipsia.  HEMATOLOGICAL:  No easy bruising or bleedings:  .     PHYSICAL EXAMINATION:    GENERAL APPEARANCE:  Pt. is not currently dyspneic, in no distress. Pt. is alert, oriented, and pleasant.  HEENT:  Pupils are normal and react normally. No icterus. Mucous membranes well colored.  NECK:  Supple. No lymphadenopathy. Jugular venous pressure not elevated. Carotids equal.   HEART:   The cardiac impulse has a normal quality. There are no murmurs, rubs or gallops noted  CHEST:  Chest is clear to auscultation. Normal respiratory effort.  ABDOMEN:  Soft and nontender.   EXTREMITIES:  There is no edema.   SKIN:  No rash or significant lesions are noted.    I&O's Summary    01 May 2018 07:01  -  01 May 2018 09:31  --------------------------------------------------------  IN: 480 mL / OUT: 0 mL / NET: 480 mL        LABS:                        13.2   7.57  )-----------( 126      ( 01 May 2018 06:45 )             42.4     05-01    145  |  110<H>  |  36<H>  ----------------------------<  73  4.1   |  32<H>  |  1.41<H>    Ca    9.1      01 May 2018 06:45                    [email or web address]:  AT Fib with  BPM with LAD and Monomorphic PVCs    TELEMETRY: At. Fib with VR     CARDIAC TESTS:    RADIOLOGY & ADDITIONAL STUDIES:    : HPI:  78 y/o male who presented to the ED for increased SOB.  Review of previous records here at  shows no prior admissions for SOB  He received 40 mg IV lasix, 125 mg IV  and solumedrol and 5 mg IV lasix in ED.        PAST MEDICAL & SURGICAL HISTORY:  Chronic atrial fibrillation (Not on OAC due to past history of GI Bleed)  HTN (hypertension), benign  No significant past surgical history      MEDICATIONS  (STANDING):  aspirin 325 milliGRAM(s) Oral daily  enoxaparin Injectable 40 milliGRAM(s) SubCutaneous every 24 hours  furosemide   Injectable 40 milliGRAM(s) IV Push two times a day  metoprolol tartrate 50 milliGRAM(s) Oral two times a day  simvastatin 40 milliGRAM(s) Oral at bedtime      Allergies:  No Known Allergies    SOCIAL HISTORY: Denies tobacco, etoh abuse or illicit drug use    FAMILY HISTORY: No pertinent family history in first degree relatives      Vital Signs Last 24 Hrs  T(C): 36.7 (01 May 2018 05:23), Max: 36.7 (01 May 2018 05:23)  T(F): 98.1 (01 May 2018 05:23), Max: 98.1 (01 May 2018 05:23)  HR: 99 (01 May 2018 07:30) (65 - 104)  BP: 104/69 (01 May 2018 07:30) (97/75 - 104/74)  RR: 16 (01 May 2018 07:30) (16 - 18)  SpO2: 95% (01 May 2018 07:30) (92% - 100%)    REVIEW OF SYSTEMS:    CONSTITUTIONAL:  As per HPI.  HEENT:  Eyes:  No diplopia or blurred vision. ENT:  No earache, sore throat or runny nose.  CARDIOVASCULAR:  No pressure, squeezing, strangling, tightness, heaviness or aching about the chest, neck, axilla or epigastrium.  RESPIRATORY:  No cough, shortness of breath, PND or orthopnea.  GASTROINTESTINAL:  No nausea, vomiting or diarrhea.  GENITOURINARY:  No dysuria, frequency or urgency.  MUSCULOSKELETAL:  As per HPI.  SKIN:  No change in skin, hair or nails.  NEUROLOGIC:  No paresthesias, fasciculations, seizures or weakness.  PSYCHIATRIC:  No disorder of thought or mood.  ENDOCRINE:  No heat or cold intolerance, polyuria or polydipsia.  HEMATOLOGICAL:  No easy bruising or bleedings:  .     PHYSICAL EXAMINATION:    GENERAL APPEARANCE:  Pt. is not currently dyspneic, in no distress. Pt. is alert, oriented, and pleasant.  HEENT:  Pupils are normal and react normally. No icterus. Mucous membranes well colored.  NECK:  Supple. No lymphadenopathy. Jugular venous pressure not elevated. Carotids equal.   HEART:   The cardiac impulse has a normal quality. There are no murmurs, rubs or gallops noted  CHEST:  Chest is clear to auscultation. Normal respiratory effort.  ABDOMEN:  Soft and nontender.   EXTREMITIES:  There is no edema.   SKIN:  No rash or significant lesions are noted.    I&O's Summary    01 May 2018 07:01  -  01 May 2018 09:31  --------------------------------------------------------  IN: 480 mL / OUT: 0 mL / NET: 480 mL        LABS:                        13.2   7.57  )-----------( 126      ( 01 May 2018 06:45 )             42.4     05-01    145  |  110<H>  |  36<H>  ----------------------------<  73  4.1   |  32<H>  |  1.41<H>    Ca    9.1      01 May 2018 06:45                    [email or web address]:  AT Fib with  BPM with LAD and Monomorphic PVCs    TELEMETRY: At. Fib with VR     CARDIAC TESTS:   Summary     The left ventricle cavity is mildly dilated.   Global LV dysfunction. Endocardium not always well seen. Wall motion   abnormalities noted. Visual estimation of left ventricle ejection   fraction   is 35-40%.   The left atrium is mildly dilated.   Normal appearing right atrium.   Normal appearing right ventricle function.   There are fibrocalcific changes noted to the aortic valve leaflets with   restriction in leaflet excursion. doppler gradients c/w mild aortic   stenosis. However, transaortic gradients are underestimated due to   impaired left ventricle systolic function. Thus AS may be more severe   than   indicated by doppler gradient.   Mild (1+) mitral regurgitation is present.   Trace tricuspid valve regurgitation is present.   Pulmonic valve not well seen.   No evidence of pericardial effusion.   No pleural comment made.   The IVC is dilated.      RADIOLOGY & ADDITIONAL STUDIES:    : HPI:  78 y/o male who presented to the ED for increased SOB.  Review of previous records here at  shows no prior admissions for SOB  He received 40 mg IV lasix, 125 mg IV  and solumedrol and 5 mg IV lasix in ED. Pt was diagnosed with Pleural Effusions  WBC and temperature WNL       PAST MEDICAL & SURGICAL HISTORY:  Chronic atrial fibrillation (Not on OAC due to past history of GI Bleed)  HTN (hypertension), benign  No significant past surgical history      MEDICATIONS  (STANDING):  aspirin 325 milliGRAM(s) Oral daily  enoxaparin Injectable 40 milliGRAM(s) SubCutaneous every 24 hours  furosemide   Injectable 40 milliGRAM(s) IV Push two times a day  metoprolol tartrate 50 milliGRAM(s) Oral two times a day  simvastatin 40 milliGRAM(s) Oral at bedtime      Allergies:  No Known Allergies    SOCIAL HISTORY: Denies tobacco, etoh abuse or illicit drug use    FAMILY HISTORY: No pertinent family history in first degree relatives      Vital Signs Last 24 Hrs  T(C): 36.7 (01 May 2018 05:23), Max: 36.7 (01 May 2018 05:23)  T(F): 98.1 (01 May 2018 05:23), Max: 98.1 (01 May 2018 05:23)  HR: 99 (01 May 2018 07:30) (65 - 104)  BP: 104/69 (01 May 2018 07:30) (97/75 - 104/74)  RR: 16 (01 May 2018 07:30) (16 - 18)  SpO2: 95% (01 May 2018 07:30) (92% - 100%)    REVIEW OF SYSTEMS:    CONSTITUTIONAL:  As per HPI.  HEENT:  Eyes:  No diplopia or blurred vision. ENT:  No earache, sore throat or runny nose.  CARDIOVASCULAR:  No pressure, squeezing, strangling, tightness, heaviness or aching about the chest, neck, axilla or epigastrium.  RESPIRATORY:  No cough, shortness of breath, PND or orthopnea.  GASTROINTESTINAL:  No nausea, vomiting or diarrhea.  GENITOURINARY:  No dysuria, frequency or urgency.  MUSCULOSKELETAL:  As per HPI.  SKIN:  No change in skin, hair or nails.  NEUROLOGIC:  No paresthesias, fasciculations, seizures or weakness.  PSYCHIATRIC:  No disorder of thought or mood.  ENDOCRINE:  No heat or cold intolerance, polyuria or polydipsia.  HEMATOLOGICAL:  No easy bruising or bleedings:  .     PHYSICAL EXAMINATION:    GENERAL APPEARANCE:  Pt. is not currently dyspneic, in no distress. Pt. is alert, oriented, and pleasant.  HEENT:  Pupils are normal and react normally. No icterus. Mucous membranes well colored.  NECK:  Supple. No lymphadenopathy. Jugular venous pressure not elevated. Carotids equal.   HEART:   The cardiac impulse has a normal quality. There are no murmurs, rubs or gallops noted  CHEST:  Chest is clear to auscultation. Normal respiratory effort.  ABDOMEN:  Soft and nontender.   EXTREMITIES:  There is no edema.   SKIN:  No rash or significant lesions are noted.    I&O's Summary    01 May 2018 07:01  -  01 May 2018 09:31  --------------------------------------------------------  IN: 480 mL / OUT: 0 mL / NET: 480 mL        LABS:                        13.2   7.57  )-----------( 126      ( 01 May 2018 06:45 )             42.4     05-01    145  |  110<H>  |  36<H>  ----------------------------<  73  4.1   |  32<H>  |  1.41<H>    Ca    9.1      01 May 2018 06:45                    [email or web address]:  AT Fib with  BPM with LAD and Monomorphic PVCs    TELEMETRY: At. Fib with VR     CARDIAC TESTS:   Summary     The left ventricle cavity is mildly dilated.   Global LV dysfunction. Endocardium not always well seen. Wall motion   abnormalities noted. Visual estimation of left ventricle ejection   fraction   is 35-40%.   The left atrium is mildly dilated.   Normal appearing right atrium.   Normal appearing right ventricle function.   There are fibrocalcific changes noted to the aortic valve leaflets with   restriction in leaflet excursion. doppler gradients c/w mild aortic   stenosis. However, transaortic gradients are underestimated due to   impaired left ventricle systolic function. Thus AS may be more severe   than   indicated by doppler gradient.   Mild (1+) mitral regurgitation is present.   Trace tricuspid valve regurgitation is present.   Pulmonic valve not well seen.   No evidence of pericardial effusion.   No pleural comment made.   The IVC is dilated.      RADIOLOGY & ADDITIONAL STUDIES:    < from: Xray Chest 1 View- PORTABLE-Routine (18 @ 20:31) >    EXAM:  XR CHEST PORTABLE ROUTINE 1V                            PROCEDURE DATE:  2018          INTERPRETATION:  Exam Date: 2018 8:31 PM    History: Shortness of breath    Technique: Single frontal portable view of the chest with no prior   studies available for comparison    Findings:    Heart is enlarged. Small bilateral pleural effusions with bibasilar   atelectasis and or pneumonia. The apices and hemidiaphragms are   unremarkable. Degenerative changes of the visualized osseous structures.        Impression:    Small bilateral pleural effusions with bibasilar atelectasis and or   pneumonia. HPI:  76 y/o male who presented to the ED for increased SOB.  Review of previous records here at  shows no prior admissions for SOB  He received 40 mg IV lasix, 125 mg IV  and solumedrol and 5 mg IV lasix in ED. Pt was diagnosed with Pleural Effusions  WBC and temperature WNL       PAST MEDICAL & SURGICAL HISTORY:  Chronic atrial fibrillation (Not on OAC due to past history of GI Bleed)  HTN (hypertension), benign  No significant past surgical history      MEDICATIONS  (STANDING):  aspirin 325 milliGRAM(s) Oral daily  enoxaparin Injectable 40 milliGRAM(s) SubCutaneous every 24 hours  furosemide   Injectable 40 milliGRAM(s) IV Push two times a day  metoprolol tartrate 50 milliGRAM(s) Oral two times a day  simvastatin 40 milliGRAM(s) Oral at bedtime      Allergies:  No Known Allergies    SOCIAL HISTORY: Denies tobacco, etoh abuse or illicit drug use    FAMILY HISTORY: No pertinent family history in first degree relatives      Vital Signs Last 24 Hrs  T(C): 36.7 (01 May 2018 05:23), Max: 36.7 (01 May 2018 05:23)  T(F): 98.1 (01 May 2018 05:23), Max: 98.1 (01 May 2018 05:23)  HR: 99 (01 May 2018 07:30) (65 - 104)  BP: 104/69 (01 May 2018 07:30) (97/75 - 104/74)  RR: 16 (01 May 2018 07:30) (16 - 18)  SpO2: 95% (01 May 2018 07:30) (92% - 100%)    REVIEW OF SYSTEMS:    CONSTITUTIONAL:  As per HPI.  HEENT:  Eyes:  No diplopia or blurred vision. ENT:  No earache, sore throat or runny nose.  CARDIOVASCULAR:  No pressure, squeezing, strangling, tightness, heaviness or aching about the chest, neck, axilla or epigastrium.  RESPIRATORY:  No cough, shortness of breath, PND or orthopnea.  GASTROINTESTINAL:  No nausea, vomiting or diarrhea.  GENITOURINARY:  No dysuria, frequency or urgency.  MUSCULOSKELETAL:  As per HPI.  SKIN:  No change in skin, hair or nails.  NEUROLOGIC:  No paresthesias, fasciculations, seizures or weakness.  PSYCHIATRIC:  No disorder of thought or mood.  ENDOCRINE:  No heat or cold intolerance, polyuria or polydipsia.  HEMATOLOGICAL:  No easy bruising or bleedings:  .     PHYSICAL EXAMINATION:    GENERAL APPEARANCE:  Pt. is not currently dyspneic, in no distress. Pt. is alert, oriented, and pleasant.  HEENT:  Pupils are normal and react normally. No icterus. Mucous membranes well colored.  NECK:  Supple. No lymphadenopathy. Jugular venous pressure not elevated. Carotids equal.   HEART:   The cardiac impulse has a normal quality. There are no murmurs, rubs or gallops noted  CHEST:  Chest is clear to auscultation. Normal respiratory effort.  ABDOMEN:  Soft and nontender, distended  EXTREMITIES:  There is 4 plus putting edema   SKIN:  No rash or significant lesions are noted.    I&O's Summary    01 May 2018 07:01  -  01 May 2018 09:31  --------------------------------------------------------  IN: 480 mL / OUT: 0 mL / NET: 480 mL        LABS:                        13.2   7.57  )-----------( 126      ( 01 May 2018 06:45 )             42.4     05-    145  |  110<H>  |  36<H>  ----------------------------<  73  4.1   |  32<H>  |  1.41<H>    Ca    9.1      01 May 2018 06:45                    [email or web address]:  AT Fib with  BPM with LAD and Monomorphic PVCs    TELEMETRY: At. Fib with VR     CARDIAC TESTS:   Summary     The left ventricle cavity is mildly dilated.   Global LV dysfunction. Endocardium not always well seen. Wall motion   abnormalities noted. Visual estimation of left ventricle ejection   fraction   is 35-40%.   The left atrium is mildly dilated.   Normal appearing right atrium.   Normal appearing right ventricle function.   There are fibrocalcific changes noted to the aortic valve leaflets with   restriction in leaflet excursion. doppler gradients c/w mild aortic   stenosis. However, transaortic gradients are underestimated due to   impaired left ventricle systolic function. Thus AS may be more severe   than   indicated by doppler gradient.   Mild (1+) mitral regurgitation is present.   Trace tricuspid valve regurgitation is present.   Pulmonic valve not well seen.   No evidence of pericardial effusion.   No pleural comment made.   The IVC is dilated.      RADIOLOGY & ADDITIONAL STUDIES:    < from: Xray Chest 1 View- PORTABLE-Routine (18 @ 20:31) >    EXAM:  XR CHEST PORTABLE ROUTINE 1V                            PROCEDURE DATE:  2018          INTERPRETATION:  Exam Date: 2018 8:31 PM    History: Shortness of breath    Technique: Single frontal portable view of the chest with no prior   studies available for comparison    Findings:    Heart is enlarged. Small bilateral pleural effusions with bibasilar   atelectasis and or pneumonia. The apices and hemidiaphragms are   unremarkable. Degenerative changes of the visualized osseous structures.        Impression:    Small bilateral pleural effusions with bibasilar atelectasis and or   pneumonia. HPI:  78 y/o male who presented to the ED for increased SOB. He is a poor historian. He denies any syncope, presyncope, palpitations. He was anticoagulated for atrial fibrillation in past but has discontinued the medication.  Review of previous records here at  shows no prior admissions for SOB  He received 40 mg IV lasix, 125 mg IV  and solumedrol and 5 mg IV lasix in ED. Pt was diagnosed with Pleural Effusions  WBC and temperature WNL.        PAST MEDICAL & SURGICAL HISTORY:  Chronic atrial fibrillation (Not on OAC due to past history of GI Bleed)  HTN (hypertension), benign  No significant past surgical history      MEDICATIONS  (STANDING):  aspirin 325 milliGRAM(s) Oral daily  enoxaparin Injectable 40 milliGRAM(s) SubCutaneous every 24 hours  furosemide   Injectable 40 milliGRAM(s) IV Push two times a day  metoprolol tartrate 50 milliGRAM(s) Oral two times a day  simvastatin 40 milliGRAM(s) Oral at bedtime      Allergies:  No Known Allergies    SOCIAL HISTORY: Denies tobacco, etoh abuse or illicit drug use    FAMILY HISTORY: No pertinent family history in first degree relatives      Vital Signs Last 24 Hrs  T(C): 36.7 (01 May 2018 05:23), Max: 36.7 (01 May 2018 05:23)  T(F): 98.1 (01 May 2018 05:23), Max: 98.1 (01 May 2018 05:23)  HR: 99 (01 May 2018 07:30) (65 - 104)  BP: 104/69 (01 May 2018 07:30) (97/75 - 104/74)  RR: 16 (01 May 2018 07:30) (16 - 18)  SpO2: 95% (01 May 2018 07:30) (92% - 100%)    REVIEW OF SYSTEMS:    CONSTITUTIONAL:  As per HPI.  HEENT:  Eyes:  No diplopia or blurred vision. ENT:  No earache, sore throat or runny nose.  CARDIOVASCULAR:  No pressure, squeezing, strangling, tightness, heaviness or aching about the chest, neck, axilla or epigastrium.  RESPIRATORY:  No cough, shortness of breath, PND or orthopnea.  GASTROINTESTINAL:  No nausea, vomiting or diarrhea.  GENITOURINARY:  No dysuria, frequency or urgency.  MUSCULOSKELETAL:  As per HPI.  SKIN:  No change in skin, hair or nails.  NEUROLOGIC:  No paresthesias, fasciculations, seizures or weakness.  PSYCHIATRIC:  No disorder of thought or mood.  ENDOCRINE:  No heat or cold intolerance, polyuria or polydipsia.  HEMATOLOGICAL:  No easy bruising or bleedings:  .     PHYSICAL EXAMINATION:    GENERAL APPEARANCE:  Pt. is not currently dyspneic, in no distress. Pt. is alert, oriented, and pleasant.  HEENT:  Pupils are normal and react normally. No icterus. Mucous membranes well colored.  NECK:  Supple. No lymphadenopathy. Jugular venous pressure not elevated. Carotids equal.   HEART:   The cardiac impulse has a normal quality. There are no murmurs, rubs or gallops noted  CHEST:  Bilateral decreased breath sound with scattered crackles.   ABDOMEN:  Soft and nontender, distended  EXTREMITIES:  There is 4 plus putting edema   SKIN:  No rash or significant lesions are noted.    I&O's Summary    01 May 2018 07:01  -  01 May 2018 09:31  --------------------------------------------------------  IN: 480 mL / OUT: 0 mL / NET: 480 mL        LABS:                        13.2   7.57  )-----------( 126      ( 01 May 2018 06:45 )             42.4     05-    145  |  110<H>  |  36<H>  ----------------------------<  73  4.1   |  32<H>  |  1.41<H>    Ca    9.1      01 May 2018 06:45            [email or web address]:  AT Fib with  BPM with LAD and Monomorphic PVCs    TELEMETRY: At. Fib with VR     CARDIAC TESTS:   Summary     The left ventricle cavity is mildly dilated.   Global LV dysfunction. Endocardium not always well seen. Wall motion   abnormalities noted. Visual estimation of left ventricle ejection   fraction   is 35-40%.   The left atrium is mildly dilated.   Normal appearing right atrium.   Normal appearing right ventricle function.   There are fibrocalcific changes noted to the aortic valve leaflets with   restriction in leaflet excursion. doppler gradients c/w mild aortic   stenosis. However, transaortic gradients are underestimated due to   impaired left ventricle systolic function. Thus AS may be more severe   than   indicated by doppler gradient.   Mild (1+) mitral regurgitation is present.   Trace tricuspid valve regurgitation is present.   Pulmonic valve not well seen.   No evidence of pericardial effusion.   No pleural comment made.   The IVC is dilated.      RADIOLOGY & ADDITIONAL STUDIES:    < from: Xray Chest 1 View- PORTABLE-Routine (18 @ 20:31) >    EXAM:  XR CHEST PORTABLE ROUTINE 1V                            PROCEDURE DATE:  2018          INTERPRETATION:  Exam Date: 2018 8:31 PM    History: Shortness of breath    Technique: Single frontal portable view of the chest with no prior   studies available for comparison    Findings:    Heart is enlarged. Small bilateral pleural effusions with bibasilar   atelectasis and or pneumonia. The apices and hemidiaphragms are   unremarkable. Degenerative changes of the visualized osseous structures.        Impression:    Small bilateral pleural effusions with bibasilar atelectasis and or   pneumonia.

## 2018-05-01 NOTE — PHYSICAL THERAPY INITIAL EVALUATION ADULT - IMPAIRMENTS FOUND, PT EVAL
gait, locomotion, and balance/ROM/anthropometric characteristics/circulation/integumentary integrity/muscle strength/aerobic capacity/endurance

## 2018-05-01 NOTE — CDI QUERY NOTE - NSCDICHFTXTBX_GEN_ALL_CORE_HH
76 y/o male presented to the ED with progressively worsening SOB over the past couple of weeks. As per patient, his SOB progressively worsening and gradually gaining weight with leg swelling. He gained almost 30 lbs over couple of weeks. He received 40 mg IV Lasix, 125 mg IV solumedrol and 5 mg IV Lasix in ED. His BNP- 2629 with 2D echo: EF 35%. Could you please clarify?  1) Acute systolic heart failure.   2) Chronic systolic heart failure.   3) Acute-On-Chronic systolic heart failure.   4) Acute diastolic heart failure.   5) Chronic diastolic heart failure.   6) Acute-On-Chronic diastolic heart failure.   7) Acute combined heart failure.   8) Chronic combined heart failure.   9) Acute-On-Chronic combined heart failure.

## 2018-05-01 NOTE — PHYSICAL THERAPY INITIAL EVALUATION ADULT - SKIN INTEGRITY
incontinence/incontinent assoc dermatitis(IAD)/healing superficial blisters with recent weeping per son zac. R leg
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
## 2018-05-01 NOTE — CONSULT NOTE ADULT - PROBLEM SELECTOR RECOMMENDATION 2
Rate controlled can increase beta blocker as tolerated.  Given [id number] score of 3 or possibly greater would long term oral anticoagulate.
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
## 2018-05-01 NOTE — PHYSICAL THERAPY INITIAL EVALUATION ADULT - PERTINENT HX OF CURRENT PROBLEM, REHAB EVAL
increasing SOB/DARLING,BLE edema ,unable to walk short distances without feeling winded,30lb weight gain; pt reports did not require home O2

## 2018-05-01 NOTE — PHYSICAL THERAPY INITIAL EVALUATION ADULT - PATIENT/FAMILY/SIGNIFICANT OTHER GOALS STATEMENT, PT EVAL
pt reports difficulty getting socks and even pants on due to edema,abdominal distention,he reports + urinary incontinence and leaking frequently,has lost the sensation of knowing he has to go x past 4 months

## 2018-05-01 NOTE — PHYSICAL THERAPY INITIAL EVALUATION ADULT - MODALITIES TREATMENT COMMENTS
Pt should wear XXL diapers during PT sessions ,while ambulating to reduce slip/fall hazards due to urinary incontinence

## 2018-05-01 NOTE — CONSULT NOTE ADULT - PROBLEM SELECTOR RECOMMENDATION 9
Newly diagnosed will need guideline directed therapy including beta blocker, ACE/ARB and diuretics.   WIll need re-evaluation of EF in 90 days. If EF

## 2018-05-02 ENCOUNTER — RX RENEWAL (OUTPATIENT)
Age: 78
End: 2018-05-02

## 2018-05-02 LAB
ANION GAP SERPL CALC-SCNC: 6 MMOL/L — SIGNIFICANT CHANGE UP (ref 5–17)
APTT BLD: 36.6 SEC — SIGNIFICANT CHANGE UP (ref 27.5–37.4)
BUN SERPL-MCNC: 35 MG/DL — HIGH (ref 7–23)
CALCIUM SERPL-MCNC: 9.1 MG/DL — SIGNIFICANT CHANGE UP (ref 8.5–10.1)
CHLORIDE SERPL-SCNC: 109 MMOL/L — HIGH (ref 96–108)
CO2 SERPL-SCNC: 31 MMOL/L — SIGNIFICANT CHANGE UP (ref 22–31)
CREAT SERPL-MCNC: 1.29 MG/DL — SIGNIFICANT CHANGE UP (ref 0.5–1.3)
GLUCOSE SERPL-MCNC: 69 MG/DL — LOW (ref 70–99)
POTASSIUM SERPL-MCNC: 3.7 MMOL/L — SIGNIFICANT CHANGE UP (ref 3.5–5.3)
POTASSIUM SERPL-SCNC: 3.7 MMOL/L — SIGNIFICANT CHANGE UP (ref 3.5–5.3)
SODIUM SERPL-SCNC: 146 MMOL/L — HIGH (ref 135–145)

## 2018-05-02 RX ORDER — HEPARIN SODIUM 5000 [USP'U]/ML
INJECTION INTRAVENOUS; SUBCUTANEOUS
Qty: 25000 | Refills: 0 | Status: DISCONTINUED | OUTPATIENT
Start: 2018-05-02 | End: 2018-05-04

## 2018-05-02 RX ORDER — HEPARIN SODIUM 5000 [USP'U]/ML
5000 INJECTION INTRAVENOUS; SUBCUTANEOUS EVERY 6 HOURS
Qty: 0 | Refills: 0 | Status: DISCONTINUED | OUTPATIENT
Start: 2018-05-02 | End: 2018-05-04

## 2018-05-02 RX ORDER — CLOPIDOGREL BISULFATE 75 MG/1
75 TABLET, FILM COATED ORAL DAILY
Qty: 0 | Refills: 0 | Status: DISCONTINUED | OUTPATIENT
Start: 2018-05-02 | End: 2018-05-05

## 2018-05-02 RX ORDER — HEPARIN SODIUM 5000 [USP'U]/ML
10000 INJECTION INTRAVENOUS; SUBCUTANEOUS EVERY 6 HOURS
Qty: 0 | Refills: 0 | Status: DISCONTINUED | OUTPATIENT
Start: 2018-05-02 | End: 2018-05-04

## 2018-05-02 RX ADMIN — CLOPIDOGREL BISULFATE 75 MILLIGRAM(S): 75 TABLET, FILM COATED ORAL at 19:11

## 2018-05-02 RX ADMIN — Medication 325 MILLIGRAM(S): at 18:25

## 2018-05-02 RX ADMIN — SIMVASTATIN 40 MILLIGRAM(S): 20 TABLET, FILM COATED ORAL at 21:05

## 2018-05-02 RX ADMIN — HEPARIN SODIUM 2400 UNIT(S)/HR: 5000 INJECTION INTRAVENOUS; SUBCUTANEOUS at 18:21

## 2018-05-02 RX ADMIN — Medication 40 MILLIGRAM(S): at 05:10

## 2018-05-02 RX ADMIN — Medication 75 MILLIGRAM(S): at 06:15

## 2018-05-02 NOTE — CHART NOTE - NSCHARTNOTEFT_GEN_A_CORE
Nurse Practitioner Progress note:     HPI:  76 y/o male w. PMHx of ?CHF, COPD, chr Afib not on AC due to GI bleed and hemoptysis, HTN, presented to the ED with progressively worsening SOB over the past couple of weeks. As per patient, his SOB progressively worsening and gradually gaining weight with leg swelling. He gained almost 30 lbs over couple of weeks. He denies any chest pain, palpitation, nausea, vomiting, abd pain.   He received 40 mg IV lasix, 125 mg IV solumedrol and 5 mg IV lasix in ED.  Pt referred for LHC with possible intervention.      s/p LHC : LAD 80-90% stenosis  Pt denies chest pain/SOB/palpitations.    PHYSICAL EXAM:  V/S:  /70          HR     105    RR 16     O2 sat 97% with 2L O2  Neurologic: Non-focal, A&Ox3.  No neuro deficits  Cardiac : (+)irregular S1S2  Lungs: diminished in B/L bases  Procedure Site: Rt. brachial sheath removed. Rt. radial hemoband site benign soft no bleeding no hematoma 2+DP      PLAN: 	  -VS, labs, diet, activity as per post cath orders  -hemoband to be removed at  12:10   -continue ASA & statin  - start plavix & IV heparin  -Pt will need PCI of LAD in tertiary hospital  -continue CHF Mx  -Plan of care D/W pt. and Dr. Monique  -Post cath instructions reviewed with pt. then pt. verbalizes understanding

## 2018-05-03 LAB
ANION GAP SERPL CALC-SCNC: 8 MMOL/L — SIGNIFICANT CHANGE UP (ref 5–17)
APTT BLD: > 200 SEC (ref 27.5–37.4)
BUN SERPL-MCNC: 35 MG/DL — HIGH (ref 7–23)
CALCIUM SERPL-MCNC: 8.9 MG/DL — SIGNIFICANT CHANGE UP (ref 8.5–10.1)
CHLORIDE SERPL-SCNC: 109 MMOL/L — HIGH (ref 96–108)
CO2 SERPL-SCNC: 29 MMOL/L — SIGNIFICANT CHANGE UP (ref 22–31)
CREAT SERPL-MCNC: 0.99 MG/DL — SIGNIFICANT CHANGE UP (ref 0.5–1.3)
GLUCOSE SERPL-MCNC: 76 MG/DL — SIGNIFICANT CHANGE UP (ref 70–99)
HCT VFR BLD CALC: 40.8 % — SIGNIFICANT CHANGE UP (ref 39–50)
HCT VFR BLD CALC: 40.8 % — SIGNIFICANT CHANGE UP (ref 39–50)
HGB BLD-MCNC: 13 G/DL — SIGNIFICANT CHANGE UP (ref 13–17)
HGB BLD-MCNC: 13.1 G/DL — SIGNIFICANT CHANGE UP (ref 13–17)
MCHC RBC-ENTMCNC: 30 PG — SIGNIFICANT CHANGE UP (ref 27–34)
MCHC RBC-ENTMCNC: 30.3 PG — SIGNIFICANT CHANGE UP (ref 27–34)
MCHC RBC-ENTMCNC: 31.9 GM/DL — LOW (ref 32–36)
MCHC RBC-ENTMCNC: 32.1 GM/DL — SIGNIFICANT CHANGE UP (ref 32–36)
MCV RBC AUTO: 94.2 FL — SIGNIFICANT CHANGE UP (ref 80–100)
MCV RBC AUTO: 94.4 FL — SIGNIFICANT CHANGE UP (ref 80–100)
NRBC # BLD: 0 /100 WBCS — SIGNIFICANT CHANGE UP (ref 0–0)
NRBC # BLD: 0 /100 WBCS — SIGNIFICANT CHANGE UP (ref 0–0)
PLATELET # BLD AUTO: 123 K/UL — LOW (ref 150–400)
PLATELET # BLD AUTO: 141 K/UL — LOW (ref 150–400)
POTASSIUM SERPL-MCNC: 3.7 MMOL/L — SIGNIFICANT CHANGE UP (ref 3.5–5.3)
POTASSIUM SERPL-SCNC: 3.7 MMOL/L — SIGNIFICANT CHANGE UP (ref 3.5–5.3)
RBC # BLD: 4.32 M/UL — SIGNIFICANT CHANGE UP (ref 4.2–5.8)
RBC # BLD: 4.33 M/UL — SIGNIFICANT CHANGE UP (ref 4.2–5.8)
RBC # FLD: 15.1 % — HIGH (ref 10.3–14.5)
RBC # FLD: 15.2 % — HIGH (ref 10.3–14.5)
SODIUM SERPL-SCNC: 146 MMOL/L — HIGH (ref 135–145)
WBC # BLD: 6.37 K/UL — SIGNIFICANT CHANGE UP (ref 3.8–10.5)
WBC # BLD: 6.62 K/UL — SIGNIFICANT CHANGE UP (ref 3.8–10.5)
WBC # FLD AUTO: 6.37 K/UL — SIGNIFICANT CHANGE UP (ref 3.8–10.5)
WBC # FLD AUTO: 6.62 K/UL — SIGNIFICANT CHANGE UP (ref 3.8–10.5)

## 2018-05-03 RX ORDER — CHOLECALCIFEROL (VITAMIN D3) 125 MCG
1 CAPSULE ORAL
Qty: 0 | Refills: 0 | COMMUNITY

## 2018-05-03 RX ORDER — METOPROLOL TARTRATE 50 MG
1 TABLET ORAL
Qty: 0 | Refills: 0 | COMMUNITY

## 2018-05-03 RX ORDER — ONDANSETRON 8 MG/1
8 TABLET, FILM COATED ORAL ONCE
Qty: 0 | Refills: 0 | Status: COMPLETED | OUTPATIENT
Start: 2018-05-03 | End: 2018-05-03

## 2018-05-03 RX ORDER — ASPIRIN/CALCIUM CARB/MAGNESIUM 324 MG
1 TABLET ORAL
Qty: 0 | Refills: 0 | COMMUNITY

## 2018-05-03 RX ORDER — SIMVASTATIN 20 MG/1
1 TABLET, FILM COATED ORAL
Qty: 0 | Refills: 0 | COMMUNITY

## 2018-05-03 RX ADMIN — HEPARIN SODIUM 0 UNIT(S)/HR: 5000 INJECTION INTRAVENOUS; SUBCUTANEOUS at 18:16

## 2018-05-03 RX ADMIN — SIMVASTATIN 40 MILLIGRAM(S): 20 TABLET, FILM COATED ORAL at 21:46

## 2018-05-03 RX ADMIN — HEPARIN SODIUM 2000 UNIT(S)/HR: 5000 INJECTION INTRAVENOUS; SUBCUTANEOUS at 02:31

## 2018-05-03 RX ADMIN — Medication 40 MILLIGRAM(S): at 05:10

## 2018-05-03 RX ADMIN — ONDANSETRON 8 MILLIGRAM(S): 8 TABLET, FILM COATED ORAL at 20:09

## 2018-05-03 RX ADMIN — Medication 325 MILLIGRAM(S): at 11:30

## 2018-05-03 RX ADMIN — HEPARIN SODIUM 1600 UNIT(S)/HR: 5000 INJECTION INTRAVENOUS; SUBCUTANEOUS at 10:25

## 2018-05-03 RX ADMIN — HEPARIN SODIUM 1200 UNIT(S)/HR: 5000 INJECTION INTRAVENOUS; SUBCUTANEOUS at 19:17

## 2018-05-03 RX ADMIN — HEPARIN SODIUM 0 UNIT(S)/HR: 5000 INJECTION INTRAVENOUS; SUBCUTANEOUS at 01:24

## 2018-05-03 RX ADMIN — HEPARIN SODIUM 0 UNIT(S)/HR: 5000 INJECTION INTRAVENOUS; SUBCUTANEOUS at 09:22

## 2018-05-03 RX ADMIN — CLOPIDOGREL BISULFATE 75 MILLIGRAM(S): 75 TABLET, FILM COATED ORAL at 11:30

## 2018-05-03 RX ADMIN — Medication 75 MILLIGRAM(S): at 05:11

## 2018-05-03 RX ADMIN — Medication 30 MILLILITER(S): at 18:19

## 2018-05-04 LAB
ANION GAP SERPL CALC-SCNC: 10 MMOL/L — SIGNIFICANT CHANGE UP (ref 5–17)
ANION GAP SERPL CALC-SCNC: 15 MMOL/L — SIGNIFICANT CHANGE UP (ref 5–17)
APTT BLD: 138.4 SEC — CRITICAL HIGH (ref 27.5–37.4)
APTT BLD: 194.5 SEC — CRITICAL HIGH (ref 27.5–37.4)
BASE EXCESS BLDA CALC-SCNC: -2.1 MMOL/L — LOW (ref -2–2)
BLOOD GAS COMMENTS ARTERIAL: SIGNIFICANT CHANGE UP
BUN SERPL-MCNC: 66 MG/DL — HIGH (ref 7–23)
BUN SERPL-MCNC: 80 MG/DL — HIGH (ref 7–23)
CALCIUM SERPL-MCNC: 8.5 MG/DL — SIGNIFICANT CHANGE UP (ref 8.5–10.1)
CALCIUM SERPL-MCNC: 9.3 MG/DL — SIGNIFICANT CHANGE UP (ref 8.5–10.1)
CHLORIDE SERPL-SCNC: 109 MMOL/L — HIGH (ref 96–108)
CHLORIDE SERPL-SCNC: 111 MMOL/L — HIGH (ref 96–108)
CO2 SERPL-SCNC: 22 MMOL/L — SIGNIFICANT CHANGE UP (ref 22–31)
CO2 SERPL-SCNC: 25 MMOL/L — SIGNIFICANT CHANGE UP (ref 22–31)
CREAT SERPL-MCNC: 1.9 MG/DL — HIGH (ref 0.5–1.3)
CREAT SERPL-MCNC: 2.12 MG/DL — HIGH (ref 0.5–1.3)
GAS PNL BLDA: SIGNIFICANT CHANGE UP
GLUCOSE BLDC GLUCOMTR-MCNC: 147 MG/DL — HIGH (ref 70–99)
GLUCOSE BLDC GLUCOMTR-MCNC: 151 MG/DL — HIGH (ref 70–99)
GLUCOSE SERPL-MCNC: 136 MG/DL — HIGH (ref 70–99)
GLUCOSE SERPL-MCNC: 163 MG/DL — HIGH (ref 70–99)
HCO3 BLDA-SCNC: 21 MMOL/L — SIGNIFICANT CHANGE UP (ref 21–29)
HCT VFR BLD CALC: 39.8 % — SIGNIFICANT CHANGE UP (ref 39–50)
HCT VFR BLD CALC: 42.8 % — SIGNIFICANT CHANGE UP (ref 39–50)
HCT VFR BLD CALC: 43 % — SIGNIFICANT CHANGE UP (ref 39–50)
HGB BLD-MCNC: 12.9 G/DL — LOW (ref 13–17)
HGB BLD-MCNC: 13.7 G/DL — SIGNIFICANT CHANGE UP (ref 13–17)
HGB BLD-MCNC: 13.8 G/DL — SIGNIFICANT CHANGE UP (ref 13–17)
HOROWITZ INDEX BLDA+IHG-RTO: 100 — SIGNIFICANT CHANGE UP
MCHC RBC-ENTMCNC: 30 PG — SIGNIFICANT CHANGE UP (ref 27–34)
MCHC RBC-ENTMCNC: 30.2 PG — SIGNIFICANT CHANGE UP (ref 27–34)
MCHC RBC-ENTMCNC: 32 GM/DL — SIGNIFICANT CHANGE UP (ref 32–36)
MCHC RBC-ENTMCNC: 32.4 GM/DL — SIGNIFICANT CHANGE UP (ref 32–36)
MCV RBC AUTO: 93.2 FL — SIGNIFICANT CHANGE UP (ref 80–100)
MCV RBC AUTO: 93.9 FL — SIGNIFICANT CHANGE UP (ref 80–100)
NRBC # BLD: 0 /100 WBCS — SIGNIFICANT CHANGE UP (ref 0–0)
NRBC # BLD: 0 /100 WBCS — SIGNIFICANT CHANGE UP (ref 0–0)
PCO2 BLDA: 34 MMHG — SIGNIFICANT CHANGE UP (ref 32–46)
PH BLDA: 7.42 — SIGNIFICANT CHANGE UP (ref 7.35–7.45)
PLATELET # BLD AUTO: 168 K/UL — SIGNIFICANT CHANGE UP (ref 150–400)
PLATELET # BLD AUTO: 176 K/UL — SIGNIFICANT CHANGE UP (ref 150–400)
PO2 BLDA: 181 MMHG — HIGH (ref 74–108)
POTASSIUM SERPL-MCNC: 5 MMOL/L — SIGNIFICANT CHANGE UP (ref 3.5–5.3)
POTASSIUM SERPL-MCNC: 5.2 MMOL/L — SIGNIFICANT CHANGE UP (ref 3.5–5.3)
POTASSIUM SERPL-SCNC: 5 MMOL/L — SIGNIFICANT CHANGE UP (ref 3.5–5.3)
POTASSIUM SERPL-SCNC: 5.2 MMOL/L — SIGNIFICANT CHANGE UP (ref 3.5–5.3)
RBC # BLD: 4.27 M/UL — SIGNIFICANT CHANGE UP (ref 4.2–5.8)
RBC # BLD: 4.56 M/UL — SIGNIFICANT CHANGE UP (ref 4.2–5.8)
RBC # FLD: 15.1 % — HIGH (ref 10.3–14.5)
RBC # FLD: 15.1 % — HIGH (ref 10.3–14.5)
SAO2 % BLDA: 96 % — SIGNIFICANT CHANGE UP (ref 92–96)
SODIUM SERPL-SCNC: 144 MMOL/L — SIGNIFICANT CHANGE UP (ref 135–145)
SODIUM SERPL-SCNC: 148 MMOL/L — HIGH (ref 135–145)
TROPONIN I SERPL-MCNC: 0.06 NG/ML — HIGH (ref 0.01–0.04)
TROPONIN I SERPL-MCNC: 0.07 NG/ML — HIGH (ref 0.01–0.04)
WBC # BLD: 18.45 K/UL — HIGH (ref 3.8–10.5)
WBC # BLD: 20.37 K/UL — HIGH (ref 3.8–10.5)
WBC # FLD AUTO: 18.45 K/UL — HIGH (ref 3.8–10.5)
WBC # FLD AUTO: 20.37 K/UL — HIGH (ref 3.8–10.5)

## 2018-05-04 PROCEDURE — 70450 CT HEAD/BRAIN W/O DYE: CPT | Mod: 26

## 2018-05-04 PROCEDURE — 71045 X-RAY EXAM CHEST 1 VIEW: CPT | Mod: 26

## 2018-05-04 PROCEDURE — 70551 MRI BRAIN STEM W/O DYE: CPT | Mod: 26

## 2018-05-04 PROCEDURE — 70544 MR ANGIOGRAPHY HEAD W/O DYE: CPT | Mod: 26,59

## 2018-05-04 RX ORDER — ESMOLOL HCL 100MG/10ML
50 VIAL (ML) INTRAVENOUS
Qty: 2500 | Refills: 0 | Status: DISCONTINUED | OUTPATIENT
Start: 2018-05-04 | End: 2018-05-04

## 2018-05-04 RX ORDER — SODIUM CHLORIDE 9 MG/ML
1000 INJECTION, SOLUTION INTRAVENOUS
Qty: 0 | Refills: 0 | Status: DISCONTINUED | OUTPATIENT
Start: 2018-05-04 | End: 2018-05-05

## 2018-05-04 RX ORDER — PANTOPRAZOLE SODIUM 20 MG/1
40 TABLET, DELAYED RELEASE ORAL ONCE
Qty: 0 | Refills: 0 | Status: COMPLETED | OUTPATIENT
Start: 2018-05-04 | End: 2018-05-04

## 2018-05-04 RX ORDER — SODIUM CHLORIDE 9 MG/ML
250 INJECTION INTRAMUSCULAR; INTRAVENOUS; SUBCUTANEOUS ONCE
Qty: 0 | Refills: 0 | Status: DISCONTINUED | OUTPATIENT
Start: 2018-05-04 | End: 2018-05-05

## 2018-05-04 RX ORDER — SODIUM CHLORIDE 9 MG/ML
1000 INJECTION INTRAMUSCULAR; INTRAVENOUS; SUBCUTANEOUS
Qty: 0 | Refills: 0 | Status: DISCONTINUED | OUTPATIENT
Start: 2018-05-04 | End: 2018-05-04

## 2018-05-04 RX ORDER — METOPROLOL TARTRATE 50 MG
5 TABLET ORAL ONCE
Qty: 0 | Refills: 0 | Status: COMPLETED | OUTPATIENT
Start: 2018-05-04 | End: 2018-05-04

## 2018-05-04 RX ORDER — DIGOXIN 250 MCG
0.25 TABLET ORAL EVERY 8 HOURS
Qty: 0 | Refills: 0 | Status: COMPLETED | OUTPATIENT
Start: 2018-05-04 | End: 2018-05-05

## 2018-05-04 RX ORDER — DIGOXIN 250 MCG
0.25 TABLET ORAL ONCE
Qty: 0 | Refills: 0 | Status: DISCONTINUED | OUTPATIENT
Start: 2018-05-04 | End: 2018-05-04

## 2018-05-04 RX ORDER — SODIUM CHLORIDE 9 MG/ML
500 INJECTION INTRAMUSCULAR; INTRAVENOUS; SUBCUTANEOUS ONCE
Qty: 0 | Refills: 0 | Status: COMPLETED | OUTPATIENT
Start: 2018-05-04 | End: 2018-05-04

## 2018-05-04 RX ORDER — PHENYLEPHRINE HYDROCHLORIDE 10 MG/ML
0.5 INJECTION INTRAVENOUS
Qty: 80 | Refills: 0 | Status: DISCONTINUED | OUTPATIENT
Start: 2018-05-04 | End: 2018-05-05

## 2018-05-04 RX ORDER — SODIUM CHLORIDE 9 MG/ML
250 INJECTION INTRAMUSCULAR; INTRAVENOUS; SUBCUTANEOUS ONCE
Qty: 0 | Refills: 0 | Status: DISCONTINUED | OUTPATIENT
Start: 2018-05-04 | End: 2018-05-04

## 2018-05-04 RX ORDER — DIGOXIN 250 MCG
0.5 TABLET ORAL ONCE
Qty: 0 | Refills: 0 | Status: COMPLETED | OUTPATIENT
Start: 2018-05-04 | End: 2018-05-04

## 2018-05-04 RX ORDER — DIGOXIN 250 MCG
0.12 TABLET ORAL EVERY OTHER DAY
Qty: 0 | Refills: 0 | Status: DISCONTINUED | OUTPATIENT
Start: 2018-05-06 | End: 2018-05-05

## 2018-05-04 RX ORDER — SIMETHICONE 80 MG/1
80 TABLET, CHEWABLE ORAL ONCE
Qty: 0 | Refills: 0 | Status: COMPLETED | OUTPATIENT
Start: 2018-05-04 | End: 2018-05-04

## 2018-05-04 RX ORDER — ONDANSETRON 8 MG/1
8 TABLET, FILM COATED ORAL EVERY 8 HOURS
Qty: 0 | Refills: 0 | Status: DISCONTINUED | OUTPATIENT
Start: 2018-05-04 | End: 2018-05-05

## 2018-05-04 RX ADMIN — HEPARIN SODIUM 800 UNIT(S)/HR: 5000 INJECTION INTRAVENOUS; SUBCUTANEOUS at 02:52

## 2018-05-04 RX ADMIN — Medication 325 MILLIGRAM(S): at 18:27

## 2018-05-04 RX ADMIN — Medication 0.25 MILLIGRAM(S): at 22:26

## 2018-05-04 RX ADMIN — HEPARIN SODIUM 0 UNIT(S)/HR: 5000 INJECTION INTRAVENOUS; SUBCUTANEOUS at 11:53

## 2018-05-04 RX ADMIN — CLOPIDOGREL BISULFATE 75 MILLIGRAM(S): 75 TABLET, FILM COATED ORAL at 18:28

## 2018-05-04 RX ADMIN — Medication 0.5 MILLIGRAM(S): at 15:13

## 2018-05-04 RX ADMIN — SIMETHICONE 80 MILLIGRAM(S): 80 TABLET, CHEWABLE ORAL at 01:54

## 2018-05-04 RX ADMIN — SODIUM CHLORIDE 1000 MILLILITER(S): 9 INJECTION INTRAMUSCULAR; INTRAVENOUS; SUBCUTANEOUS at 14:56

## 2018-05-04 RX ADMIN — PHENYLEPHRINE HYDROCHLORIDE 28.14 MICROGRAM(S)/KG/MIN: 10 INJECTION INTRAVENOUS at 15:13

## 2018-05-04 RX ADMIN — Medication 5 MILLIGRAM(S): at 05:00

## 2018-05-04 RX ADMIN — Medication 40 MILLIGRAM(S): at 05:04

## 2018-05-04 RX ADMIN — SODIUM CHLORIDE 16.67 MILLILITER(S): 9 INJECTION INTRAMUSCULAR; INTRAVENOUS; SUBCUTANEOUS at 15:20

## 2018-05-04 RX ADMIN — PANTOPRAZOLE SODIUM 40 MILLIGRAM(S): 20 TABLET, DELAYED RELEASE ORAL at 15:21

## 2018-05-04 RX ADMIN — Medication 75 MILLIGRAM(S): at 06:34

## 2018-05-04 RX ADMIN — HEPARIN SODIUM 0 UNIT(S)/HR: 5000 INJECTION INTRAVENOUS; SUBCUTANEOUS at 01:46

## 2018-05-04 RX ADMIN — Medication 0.5 INCH(S): at 15:20

## 2018-05-04 RX ADMIN — Medication 5 MILLIGRAM(S): at 00:18

## 2018-05-04 RX ADMIN — Medication 5 MILLIGRAM(S): at 02:04

## 2018-05-04 RX ADMIN — SODIUM CHLORIDE 100 MILLILITER(S): 9 INJECTION, SOLUTION INTRAVENOUS at 15:13

## 2018-05-04 NOTE — PROVIDER CONTACT NOTE (OTHER) - BACKGROUND
Admitted with rapid a-fib, on hep gtt @12 for 80-90% blockage in LAD. Hx of a-fib off xarelto due to GI bleed, currently on Hep gtt @12, continue to monitor, chf.

## 2018-05-04 NOTE — PROVIDER CONTACT NOTE (CHANGE IN STATUS NOTIFICATION) - SITUATION
Pt. unresponsive in bed. RR called./64,o2 sat 93 % placed on NRB. Pt. transferred  to CCU via stretcher.Heparin drip stopped during RR as ordered by  Dr. mcgill.Report given prior to tx.

## 2018-05-04 NOTE — CHART NOTE - NSCHARTNOTEFT_GEN_A_CORE
Rapid response called @ ~9:30 AM for change in mental status. As per nurse patient was a little off this AM and is now much worse. Now he isn't responding to voice or to touch or to pain. Admitted for afib w/ RVR. Vitals at time of assessment are SBP in 110-1120s, HR irregular 90s-160s, O2 sat on NC in high 80s, on non-rebreather up to ~95-96%.    Vital Signs Last 24 Hrs  T(C): 36.7 (05-04-18 @ 04:45)  T(F): 98.1 (05-04-18 @ 04:45), Max: 98.3 (05-03-18 @ 20:58)  HR: 62 (05-04-18 @ 04:45) (53 - 142)  BP: 95/60 (05-04-18 @ 07:50)  BP(mean): --  RR: 20 (05-04-18 @ 01:41) (17 - 20)  SpO2: 91% (05-04-18 @ 04:45) (91% - 99%)  Wt(kg): --    PHYSICAL EXAM:    GENERAL: AOx0, NAD, well-groomed, well-developed  HEAD:  NC/AT.  EYES: EOMI, PERRLA, no scleral icterus. + gaze preference to R upper   HEENT: Moist mucous membranes  NECK: Supple, No JVD  CNS: could not assess  LUNG: distant breath sounds, + coarse breath sounds RLL.  HEART: IRRR; No murmurs, rubs, or gallops  ABDOMEN: ST/ND/NT  EXTREMITIES:  2+ Peripheral Pulses, No clubbing, cyanosis. + edema  MUSCULOSKELETAL- Joints normal ROM, no Muscle or joint tenderness    A/P: 77M with acute AMS while on hep gtt for afib w/ RVR.     Transfer to CCU  Will likely intubate patient  f/u stat ABG  f/u stat CXR  f/u stat labs  Will get CT head after intubation - possible patient had brain bleed while on hep gtt    Discussed w/ intensivist

## 2018-05-04 NOTE — CHART NOTE - NSCHARTNOTEFT_GEN_A_CORE
Was informed by RN that pt was hematuric. Granda cath with ~900 ml of dark red bloody urine  S/P cardiac cath 5/2/18 90% LAD critical stenosis  Heparin drip and plavix started  now with hematuria  D/W Dr Monique  D/C Heparin  Follow CBC

## 2018-05-05 VITALS
SYSTOLIC BLOOD PRESSURE: 95 MMHG | OXYGEN SATURATION: 92 % | HEART RATE: 109 BPM | RESPIRATION RATE: 14 BRPM | DIASTOLIC BLOOD PRESSURE: 30 MMHG

## 2018-05-05 LAB
ADD ON TEST-SPECIMEN IN LAB: SIGNIFICANT CHANGE UP
ALBUMIN SERPL ELPH-MCNC: 1.4 G/DL — LOW (ref 3.3–5)
ALP SERPL-CCNC: 48 U/L — SIGNIFICANT CHANGE UP (ref 40–120)
ALT FLD-CCNC: 15 U/L — SIGNIFICANT CHANGE UP (ref 12–78)
AMMONIA BLD-MCNC: 534 UMOL/L — HIGH (ref 11–32)
ANION GAP SERPL CALC-SCNC: 14 MMOL/L — SIGNIFICANT CHANGE UP (ref 5–17)
AST SERPL-CCNC: 47 U/L — HIGH (ref 15–37)
BILIRUB DIRECT SERPL-MCNC: 0.6 MG/DL — HIGH (ref 0–0.2)
BILIRUB INDIRECT FLD-MCNC: 0.4 MG/DL — SIGNIFICANT CHANGE UP (ref 0.2–1)
BILIRUB SERPL-MCNC: 1 MG/DL — SIGNIFICANT CHANGE UP (ref 0.2–1.2)
BUN SERPL-MCNC: 71 MG/DL — HIGH (ref 7–23)
CALCIUM SERPL-MCNC: 5.2 MG/DL — CRITICAL LOW (ref 8.5–10.1)
CHLORIDE SERPL-SCNC: 124 MMOL/L — HIGH (ref 96–108)
CO2 SERPL-SCNC: 16 MMOL/L — LOW (ref 22–31)
CREAT SERPL-MCNC: 2.01 MG/DL — HIGH (ref 0.5–1.3)
DIGOXIN SERPL-MCNC: 1.12 NG/ML — SIGNIFICANT CHANGE UP (ref 0.8–2)
GLUCOSE BLDC GLUCOMTR-MCNC: 32 MG/DL — CRITICAL LOW (ref 70–99)
GLUCOSE BLDC GLUCOMTR-MCNC: 68 MG/DL — LOW (ref 70–99)
GLUCOSE BLDC GLUCOMTR-MCNC: 81 MG/DL — SIGNIFICANT CHANGE UP (ref 70–99)
GLUCOSE BLDC GLUCOMTR-MCNC: 95 MG/DL — SIGNIFICANT CHANGE UP (ref 70–99)
GLUCOSE SERPL-MCNC: 51 MG/DL — LOW (ref 70–99)
HCT VFR BLD CALC: 35.5 % — LOW (ref 39–50)
HGB BLD-MCNC: 10.7 G/DL — LOW (ref 13–17)
LACTATE SERPL-SCNC: 5.2 MMOL/L — CRITICAL HIGH (ref 0.7–2)
LACTATE SERPL-SCNC: 6.8 MMOL/L — CRITICAL HIGH (ref 0.7–2)
MAGNESIUM SERPL-MCNC: 1.9 MG/DL — SIGNIFICANT CHANGE UP (ref 1.6–2.6)
MCHC RBC-ENTMCNC: 29.6 PG — SIGNIFICANT CHANGE UP (ref 27–34)
MCHC RBC-ENTMCNC: 30.1 GM/DL — LOW (ref 32–36)
MCV RBC AUTO: 98.3 FL — SIGNIFICANT CHANGE UP (ref 80–100)
NRBC # BLD: 0 /100 WBCS — SIGNIFICANT CHANGE UP (ref 0–0)
PHOSPHATE SERPL-MCNC: 3.4 MG/DL — SIGNIFICANT CHANGE UP (ref 2.5–4.5)
PLATELET # BLD AUTO: 136 K/UL — LOW (ref 150–400)
POTASSIUM SERPL-MCNC: 3.7 MMOL/L — SIGNIFICANT CHANGE UP (ref 3.5–5.3)
POTASSIUM SERPL-SCNC: 3.7 MMOL/L — SIGNIFICANT CHANGE UP (ref 3.5–5.3)
PROT SERPL-MCNC: 3.6 GM/DL — LOW (ref 6–8.3)
RBC # BLD: 3.61 M/UL — LOW (ref 4.2–5.8)
RBC # FLD: 14.8 % — HIGH (ref 10.3–14.5)
SODIUM SERPL-SCNC: 154 MMOL/L — HIGH (ref 135–145)
TROPONIN I SERPL-MCNC: 0.12 NG/ML — HIGH (ref 0.01–0.04)
WBC # BLD: 7.51 K/UL — SIGNIFICANT CHANGE UP (ref 3.8–10.5)
WBC # FLD AUTO: 7.51 K/UL — SIGNIFICANT CHANGE UP (ref 3.8–10.5)

## 2018-05-05 PROCEDURE — 99222 1ST HOSP IP/OBS MODERATE 55: CPT

## 2018-05-05 PROCEDURE — 74176 CT ABD & PELVIS W/O CONTRAST: CPT | Mod: 26

## 2018-05-05 PROCEDURE — 93308 TTE F-UP OR LMTD: CPT | Mod: 26

## 2018-05-05 PROCEDURE — 95819 EEG AWAKE AND ASLEEP: CPT | Mod: 26

## 2018-05-05 PROCEDURE — 71045 X-RAY EXAM CHEST 1 VIEW: CPT | Mod: 26

## 2018-05-05 RX ORDER — CEFEPIME 1 G/1
1000 INJECTION, POWDER, FOR SOLUTION INTRAMUSCULAR; INTRAVENOUS ONCE
Qty: 0 | Refills: 0 | Status: COMPLETED | OUTPATIENT
Start: 2018-05-05 | End: 2018-05-05

## 2018-05-05 RX ORDER — VANCOMYCIN HCL 1 G
1000 VIAL (EA) INTRAVENOUS ONCE
Qty: 0 | Refills: 0 | Status: COMPLETED | OUTPATIENT
Start: 2018-05-05 | End: 2018-05-05

## 2018-05-05 RX ORDER — SODIUM CHLORIDE 9 MG/ML
500 INJECTION INTRAMUSCULAR; INTRAVENOUS; SUBCUTANEOUS ONCE
Qty: 0 | Refills: 0 | Status: COMPLETED | OUTPATIENT
Start: 2018-05-05 | End: 2018-05-05

## 2018-05-05 RX ORDER — NOREPINEPHRINE BITARTRATE/D5W 8 MG/250ML
0.2 PLASTIC BAG, INJECTION (ML) INTRAVENOUS
Qty: 8 | Refills: 0 | Status: DISCONTINUED | OUTPATIENT
Start: 2018-05-05 | End: 2018-05-05

## 2018-05-05 RX ORDER — PHYTONADIONE (VIT K1) 5 MG
2.5 TABLET ORAL ONCE
Qty: 0 | Refills: 0 | Status: COMPLETED | OUTPATIENT
Start: 2018-05-05 | End: 2018-05-05

## 2018-05-05 RX ORDER — LACTULOSE 10 G/15ML
45 SOLUTION ORAL EVERY 4 HOURS
Qty: 0 | Refills: 0 | Status: DISCONTINUED | OUTPATIENT
Start: 2018-05-05 | End: 2018-05-05

## 2018-05-05 RX ORDER — MORPHINE SULFATE 50 MG/1
2 CAPSULE, EXTENDED RELEASE ORAL
Qty: 100 | Refills: 0 | Status: DISCONTINUED | OUTPATIENT
Start: 2018-05-05 | End: 2018-05-05

## 2018-05-05 RX ORDER — LEVETIRACETAM 250 MG/1
750 TABLET, FILM COATED ORAL EVERY 12 HOURS
Qty: 0 | Refills: 0 | Status: DISCONTINUED | OUTPATIENT
Start: 2018-05-05 | End: 2018-05-05

## 2018-05-05 RX ORDER — MIDAZOLAM HYDROCHLORIDE 1 MG/ML
5 INJECTION, SOLUTION INTRAMUSCULAR; INTRAVENOUS
Qty: 100 | Refills: 0 | Status: DISCONTINUED | OUTPATIENT
Start: 2018-05-05 | End: 2018-05-05

## 2018-05-05 RX ORDER — NOREPINEPHRINE BITARTRATE/D5W 8 MG/250ML
0.2 PLASTIC BAG, INJECTION (ML) INTRAVENOUS
Qty: 16 | Refills: 0 | Status: DISCONTINUED | OUTPATIENT
Start: 2018-05-05 | End: 2018-05-05

## 2018-05-05 RX ORDER — CALCIUM GLUCONATE 100 MG/ML
2 VIAL (ML) INTRAVENOUS ONCE
Qty: 0 | Refills: 0 | Status: COMPLETED | OUTPATIENT
Start: 2018-05-05 | End: 2018-05-05

## 2018-05-05 RX ORDER — DEXTROSE 10 % IN WATER 10 %
1000 INTRAVENOUS SOLUTION INTRAVENOUS
Qty: 0 | Refills: 0 | Status: DISCONTINUED | OUTPATIENT
Start: 2018-05-05 | End: 2018-05-05

## 2018-05-05 RX ORDER — SODIUM CHLORIDE 9 MG/ML
1000 INJECTION, SOLUTION INTRAVENOUS
Qty: 0 | Refills: 0 | Status: DISCONTINUED | OUTPATIENT
Start: 2018-05-05 | End: 2018-05-05

## 2018-05-05 RX ORDER — MORPHINE SULFATE 50 MG/1
2 CAPSULE, EXTENDED RELEASE ORAL
Qty: 0 | Refills: 0 | Status: DISCONTINUED | OUTPATIENT
Start: 2018-05-05 | End: 2018-05-05

## 2018-05-05 RX ORDER — HYDROCORTISONE 20 MG
50 TABLET ORAL EVERY 8 HOURS
Qty: 0 | Refills: 0 | Status: DISCONTINUED | OUTPATIENT
Start: 2018-05-05 | End: 2018-05-05

## 2018-05-05 RX ORDER — SODIUM BICARBONATE 1 MEQ/ML
0.03 SYRINGE (ML) INTRAVENOUS
Qty: 50 | Refills: 0 | Status: DISCONTINUED | OUTPATIENT
Start: 2018-05-05 | End: 2018-05-05

## 2018-05-05 RX ORDER — CEFEPIME 1 G/1
INJECTION, POWDER, FOR SOLUTION INTRAMUSCULAR; INTRAVENOUS
Qty: 0 | Refills: 0 | Status: DISCONTINUED | OUTPATIENT
Start: 2018-05-05 | End: 2018-05-05

## 2018-05-05 RX ADMIN — SODIUM CHLORIDE 500 MILLILITER(S): 9 INJECTION INTRAMUSCULAR; INTRAVENOUS; SUBCUTANEOUS at 01:26

## 2018-05-05 RX ADMIN — Medication 250 MILLIGRAM(S): at 12:47

## 2018-05-05 RX ADMIN — Medication 56.29 MICROGRAM(S)/KG/MIN: at 13:28

## 2018-05-05 RX ADMIN — Medication 0.25 MILLIGRAM(S): at 06:36

## 2018-05-05 RX ADMIN — MIDAZOLAM HYDROCHLORIDE 5 MG/HR: 1 INJECTION, SOLUTION INTRAMUSCULAR; INTRAVENOUS at 13:43

## 2018-05-05 RX ADMIN — SODIUM CHLORIDE 100 MILLILITER(S): 9 INJECTION, SOLUTION INTRAVENOUS at 02:00

## 2018-05-05 RX ADMIN — PHENYLEPHRINE HYDROCHLORIDE 28.14 MICROGRAM(S)/KG/MIN: 10 INJECTION INTRAVENOUS at 07:56

## 2018-05-05 RX ADMIN — Medication 1 MILLIGRAM(S): at 09:20

## 2018-05-05 RX ADMIN — LACTULOSE 45 GRAM(S): 10 SOLUTION ORAL at 13:29

## 2018-05-05 RX ADMIN — PHENYLEPHRINE HYDROCHLORIDE 28.14 MICROGRAM(S)/KG/MIN: 10 INJECTION INTRAVENOUS at 04:20

## 2018-05-05 RX ADMIN — LEVETIRACETAM 400 MILLIGRAM(S): 250 TABLET, FILM COATED ORAL at 10:35

## 2018-05-05 RX ADMIN — SODIUM CHLORIDE 500 MILLILITER(S): 9 INJECTION INTRAMUSCULAR; INTRAVENOUS; SUBCUTANEOUS at 00:27

## 2018-05-05 RX ADMIN — SODIUM CHLORIDE 100 MILLILITER(S): 9 INJECTION, SOLUTION INTRAVENOUS at 11:33

## 2018-05-05 RX ADMIN — Medication 2 MILLIGRAM(S): at 11:00

## 2018-05-05 RX ADMIN — Medication 1 MILLIGRAM(S): at 07:56

## 2018-05-05 RX ADMIN — SODIUM CHLORIDE 500 MILLILITER(S): 9 INJECTION INTRAMUSCULAR; INTRAVENOUS; SUBCUTANEOUS at 07:57

## 2018-05-05 RX ADMIN — Medication 56.29 MICROGRAM(S)/KG/MIN: at 09:41

## 2018-05-05 RX ADMIN — Medication 200 GRAM(S): at 12:02

## 2018-05-05 RX ADMIN — PHENYLEPHRINE HYDROCHLORIDE 28.14 MICROGRAM(S)/KG/MIN: 10 INJECTION INTRAVENOUS at 10:39

## 2018-05-05 RX ADMIN — Medication 100 MEQ/KG/HR: at 12:17

## 2018-05-05 RX ADMIN — MORPHINE SULFATE 2 MILLIGRAM(S): 50 CAPSULE, EXTENDED RELEASE ORAL at 15:07

## 2018-05-05 RX ADMIN — Medication 50 MILLIGRAM(S): at 13:30

## 2018-05-05 RX ADMIN — CEFEPIME 1000 MILLIGRAM(S): 1 INJECTION, POWDER, FOR SOLUTION INTRAMUSCULAR; INTRAVENOUS at 09:42

## 2018-05-05 NOTE — CONSULT NOTE ADULT - SUBJECTIVE AND OBJECTIVE BOX
77y old  Male who presents with a chief complaint of SOB and leg edema, S/P cath (29 Apr 2018 13:11)    HPI:  76 y/o male w. PMHx of ?CHF, COPD, chr Afib (not on AC due to GI bleed - hemoptysis) HTN, admitted on 4/28 with progressively worsening SOB, gradually gaining weight with leg swelling, had gained almost 30 lbs over couple of weeks. Pt was treated with IV lasix, hospital course Afib on tele- , pt had cardiac catheterizaion showing LAD lesion was going to be transferred to [facility] for intervention.    On 5/4/18 RRT called as patient was unresponsive, MRI/A brain done showed no acute infarct. Patient was intubated for airway protection, he has been noted to have right facial twitching and intermittent upper extremity twitches, has been noted to have elevated lactate. Troponins are mildly elevated. Pt is hypotensive, on pressors     PAST MEDICAL & SURGICAL HISTORY:  Chronic atrial fibrillation  HTN (hypertension), benign  No significant past surgical history    FAMILY HISTORY:  No pertinent family history in first degree relatives    Social Hx:  Nonsmoker, no drug or alcohol use    MEDICATIONS  (STANDING):  aspirin 325 milliGRAM(s) Oral daily  cefepime  Injectable.      clopidogrel Tablet 75 milliGRAM(s) Oral daily  digoxin     Tablet 0.125 milliGRAM(s) Oral every other day  levETIRAcetam  IVPB 750 milliGRAM(s) IV Intermittent every 12 hours  LORazepam   Injectable 1 milliGRAM(s) IV Push daily  metoprolol succinate ER 75 milliGRAM(s) Oral daily  norepinephrine Infusion 0.2 MICROgram(s)/kG/Min (56.288 mL/Hr) IV Continuous <Continuous>  phenylephrine    Infusion 0.5 MICROgram(s)/kG/Min (28.144 mL/Hr) IV Continuous <Continuous>  simvastatin 40 milliGRAM(s) Oral at bedtime  sodium chloride 0.45%. 1000 milliLiter(s) (100 mL/Hr) IV Continuous <Continuous>  sodium chloride 0.9% Bolus 250 milliLiter(s) IV Bolus once  vancomycin  IVPB 1000 milliGRAM(s) IV Intermittent once       Allergies  No Known Allergies  Intolerances    ROS: Pertinent positives in HPI, all other ROS unable to obtain    Vital Signs Last 24 Hrs  T(C): 37.3 (05 May 2018 06:39), Max: 37.3 (05 May 2018 06:39)  T(F): 99.2 (05 May 2018 06:39), Max: 99.2 (05 May 2018 06:39)  HR: 108 (05 May 2018 08:55) (99 - 120)  BP: 83/50 (05 May 2018 06:30) (79/39 - 105/53)  BP(mean): 58 (05 May 2018 06:30) (49 - 86)  RR: 22 (05 May 2018 06:30) (19 - 42)  SpO2: 97% (05 May 2018 08:55) (77% - 100%)    GE:  Constitutional: intubated, unresponsive  HEENT: no neck masses   Neck: Supple.  Respiratory: Breath sounds are clear bilaterally  Cardiovascular: S1 and S2,   Extremities: chronic LE edema  Vascular: Caritid Bruit - no  Musculoskeletal: no joint swelling/tenderness,   Skin: chronic skin chnages / cellulitis LE    Neurological exam:  HF: unresponsive / stuporous   CN: ОЛЬГА, corneals / Dolls EM +, face grossly symmetric  Motor: Flaccid all 4 ext, no posturing, no spontanous movements.    Sens/ co-ord: limited    Reflexes: hyporeflexic   Gait/Balance: Cannot test    Labs:   05-05    154<H>  |  124<H>  |  71<H>  ----------------------------<  51<L>  3.7   |  16<L>  |  2.01<H>    Ca    5.2<LL>      05 May 2018 05:33                        10.7   7.51  )-----------( 136      ( 05 May 2018 05:33 )             35.5     Radiology:  - MRI brain: < from: MR Head No Cont (05.04.18 @ 14:56) >  mild periventricular and bifrontal deep white matter   ischemia.   Mild atrophy most prominent in the BILATERAL parietal   lobes.Tiny old lacunar infarctions are seen in the BILATERAL basal   ganglia. Tiny old cortical strokes are seen in the BILATERAL parietal   lobes.     < end of copied text >    -MRA brain: < from: MR Angio Head No Cont (05.04.18 @ 14:56) >  Unremarkable MR angiography of the intracranial  circulation.               < end of copied text >

## 2018-05-05 NOTE — PROGRESS NOTE ADULT - PROVIDER SPECIALTY LIST ADULT
Cardiology
Critical Care
Hospitalist
Cardiology

## 2018-05-05 NOTE — CONSULT NOTE ADULT - ASSESSMENT
78 yo male w PMHX of CHF, COPD Afib, HTN, presenting with SOB - s/p card cath w LAD lesion  - s/p cath RRT w respiratory failure and now shock c/b seizures    w hypoglycemia and hx of ETOH - concern liver failure    FRANKLYN - in setting of ATN from shock    - keep sbp > 110    - continue pressors per Intensivist -    - garcia - strict I and os'    - urine lytes   - daily labs     Hypernatremia    - sec to large volume saline infusions   - D10 beng added for hypoglycemia - will help Na - continue hyptonic fluids if BP stable    - daily labs      Hypocalcemia - acute - sequestration?   - check vit D, pth, albumin ,    - Ca Gluconate 2 grams x 1    - check mag and phos - replete if low to aid in Ca balance   - check CPK ( w recent seizures   -  check lipase)       AMS - remains obtunded - hypoxia    - check ammonia level    - check LFT's    Lacitic metabolic Acidosis - ( AG 14) - sec to hypoperfusion and liver    - hypotonic bicarbonate gtt     repeat labs this afternoon   d.w son and Dr Bolanos and Dr Castillo at length    poor prognosis    condition is critical.    Thank you for the courtesy of this consult. We will follow this patient with you.   Management is subject to change if new information becomes available or patient condition changes.

## 2018-05-05 NOTE — PROGRESS NOTE ADULT - SUBJECTIVE AND OBJECTIVE BOX
76 y/o male w. PMHx of ?CHF, COPD, chr Afib not on AC due to GI bleed and hemoptysis, HTN, presented to the ED with progressively worsening SOB over the past couple of weeks. As per patient, his SOB progressively worsening and gradually gaining weight with leg swelling. He gained almost 30 lbs over couple of weeks. He denies any chest pain, palpitation, nausea, vomiting, abd pain.   He received 40 mg IV lasix, 125 mg IV solumedrol and 5 mg IV lasix in ED.       04/30/18: patient seen and examined. SOB and leg edema improving.   05/01/18: Patient seen and examined. He denies any new complaints. Discussed with patient and son at bed side regarding management plan including cardiac cath.       Vital Signs Last 24 Hrs  T(C): 36.4 (01 May 2018 10:40), Max: 36.7 (01 May 2018 05:23)  T(F): 97.5 (01 May 2018 10:40), Max: 98.1 (01 May 2018 05:23)  HR: 107 (01 May 2018 10:40) (65 - 107)  BP: 91/60 (01 May 2018 10:40) (91/60 - 104/74)  BP(mean): --  RR: 18 (01 May 2018 10:40) (16 - 18)  SpO2: 96% (01 May 2018 10:40) (94% - 100%)          Physical Exam:       CONSTITUTIONAL: Well appearing, well nourished, awake, alert, oriented to person, place, time/situation and in no apparent distress.  	· ENMT: Airway patent, Nasal mucosa clear. Mouth with normal mucosa. Throat has no vesicles, no oropharyngeal exudates and uvula is midline.  	· EYES: Clear bilaterally, pupils equal, round and reactive to light.  	· CARDIAC: +JVD, tachycardic, rapid, irregular  	· RESPIRATORY: b/l rhonchi.  	· GASTROINTESTINAL: Abdomen soft, non-tender, no guarding.  	· MUSCULOSKELETAL: 2+ pitting edema, erythema and scalenes to b/l LE's. Spine appears normal, range of motion is not limited, no muscle or joint tenderness  	· NEUROLOGICAL: Alert and oriented, no focal deficits, no motor or sensory deficits.  	· SKIN: Skin normal color for race, warm, dry and intact. No evidence of rash.  	· PSYCHIATRIC: Alert and oriented to person, place, time/situation. normal mood and affect. no apparent risk to self or others.  · HEME LYMPH: No adenopathy or splenomegaly. No cervical or inguinal lymphadenopathy.          Labs:                                              13.2   7.57  )-----------( 126      ( 01 May 2018 06:45 )             42.4     01 May 2018 06:45    145    |  110    |  36     ----------------------------<  73     4.1     |  32     |  1.41     Ca    9.1        01 May 2018 06:45            MEDICATIONS:        MEDICATIONS  (STANDING):  aspirin 325 milliGRAM(s) Oral daily  enoxaparin Injectable 40 milliGRAM(s) SubCutaneous every 24 hours  metoprolol succinate ER 75 milliGRAM(s) Oral daily  simvastatin 40 milliGRAM(s) Oral at bedtime    MEDICATIONS  (PRN):        Assessment and Plan:   Assessment:  · Assessment		   76 y/o male w. PMHx of ?CHF, COPD, chr Afib not on AC due to GI bleed and hemoptysis, HTN, presented to the ED with progressively worsening SOB over the past couple of weeks. As per patient, his SOB progressively worsening and gradually gaining weight with leg swelling. He gained almost 30 lbs over couple of weeks. He denies any chest pain, palpitation, nausea, vomiting, abd pain.   He received 40 mg IV lasix, 125 mg IV solumedrol and 5 mg IV lasix in ED.      1. Acute CHF-unknown EF  Daily weight, fluid restrictions  Strict I/Os  Continue IV lasix, follow renal function while on lasix  Check 2D echo: EF 35%  Dr Castillo follow up appreciated  Start low dose ACE, hold for now due to worsening renal function and low BP  EP consult appreciated.  cath tomorrow if patient agreeable.     2. Chr A Fib with RVR  Continue lopressor and aspirin  Patient was on xarelto in the past, off now due to GI bleed and hemoptysis.    3. HTN-  Continue lopressor    4. Hyperlipidemia-  Continue  statin    5. Hypernatremia-  Follow BMP    6. DVT prophylaxis.
78 y/o male w. PMHx of ?CHF, COPD, chr Afib not on AC due to GI bleed and hemoptysis, HTN, presented to the ED with progressively worsening SOB over the past couple of weeks. As per patient, his SOB progressively worsening and gradually gaining weight with leg swelling. He gained almost 30 lbs over couple of weeks. He denies any chest pain, palpitation, nausea, vomiting, abd pain.   He received 40 mg IV lasix, 125 mg IV solumedrol and 5 mg IV lasix in ED.       04/30/18: patient seen and examined. SOB and leg edema improving.     Vital Signs Last 24 Hrs  T(C): 36.3 (30 Apr 2018 11:27), Max: 37.1 (30 Apr 2018 06:14)  T(F): 97.3 (30 Apr 2018 11:27), Max: 98.7 (30 Apr 2018 06:14)  HR: 92 (30 Apr 2018 11:27) (86 - 110)  BP: 97/75 (30 Apr 2018 11:27) (97/75 - 115/84)  BP(mean): --  RR: 17 (30 Apr 2018 11:27) (17 - 18)  SpO2: 92% (30 Apr 2018 11:27) (92% - 95%)          Physical Exam:       CONSTITUTIONAL: Well appearing, well nourished, awake, alert, oriented to person, place, time/situation and in no apparent distress.  	· ENMT: Airway patent, Nasal mucosa clear. Mouth with normal mucosa. Throat has no vesicles, no oropharyngeal exudates and uvula is midline.  	· EYES: Clear bilaterally, pupils equal, round and reactive to light.  	· CARDIAC: +JVD, tachycardic, rapid, irregular  	· RESPIRATORY: b/l rhonchi.  	· GASTROINTESTINAL: Abdomen soft, non-tender, no guarding.  	· MUSCULOSKELETAL: 2+ pitting edema, erythema and scalenes to b/l LE's. Spine appears normal, range of motion is not limited, no muscle or joint tenderness  	· NEUROLOGICAL: Alert and oriented, no focal deficits, no motor or sensory deficits.  	· SKIN: Skin normal color for race, warm, dry and intact. No evidence of rash.  	· PSYCHIATRIC: Alert and oriented to person, place, time/situation. normal mood and affect. no apparent risk to self or others.  · HEME LYMPH: No adenopathy or splenomegaly. No cervical or inguinal lymphadenopathy.            Labs:                               13.1   9.02  )-----------( 135      ( 30 Apr 2018 06:41 )             41.5     30 Apr 2018 06:41    148    |  114    |  31     ----------------------------<  108    4.0     |  26     |  1.31     Ca    8.9        30 Apr 2018 06:41  Mg     2.3       28 Apr 2018 19:58    TPro  7.1    /  Alb  3.2    /  TBili  0.6    /  DBili  x      /  AST  26     /  ALT  22     /  AlkPhos  104    28 Apr 2018 19:58    LIVER FUNCTIONS - ( 28 Apr 2018 19:58 )  Alb: 3.2 g/dL / Pro: 7.1 gm/dL / ALK PHOS: 104 U/L / ALT: 22 U/L / AST: 26 U/L / GGT: x           PT/INR - ( 28 Apr 2018 19:58 )   PT: 10.9 sec;   INR: 1.01 ratio           CAPILLARY BLOOD GLUCOSE        CARDIAC MARKERS ( 28 Apr 2018 23:33 )  <0.015 ng/mL / x     / x     / x     / x      CARDIAC MARKERS ( 28 Apr 2018 19:58 )  <0.015 ng/mL / x     / x     / x     / x                  MEDICATIONS:    aspirin 325 milliGRAM(s) Oral daily  enoxaparin Injectable 40 milliGRAM(s) SubCutaneous every 24 hours  furosemide   Injectable 40 milliGRAM(s) IV Push two times a day  metoprolol tartrate 50 milliGRAM(s) Oral two times a day  simvastatin 40 milliGRAM(s) Oral at bedtime    MEDICATIONS  (PRN):        Assessment and Plan:   Assessment:  · Assessment		   78 y/o male w. PMHx of ?CHF, COPD, chr Afib not on AC due to GI bleed and hemoptysis, HTN, presented to the ED with progressively worsening SOB over the past couple of weeks. As per patient, his SOB progressively worsening and gradually gaining weight with leg swelling. He gained almost 30 lbs over couple of weeks. He denies any chest pain, palpitation, nausea, vomiting, abd pain.   He received 40 mg IV lasix, 125 mg IV solumedrol and 5 mg IV lasix in ED.      1. Acute CHF-unknown EF  Daily weight, fluid restrictions  Strict I/Os  Continue IV lasix, follow renal function while on lasix  Check 2D echo: EF 35%  Dr Castillo consult appreciated  Start low dose ACE  EP consult    2. Chr A Fib with RVR  Continue lopressor and aspirin  Patient was on xarelto in the past, off now due to GI bleed and hemoptysis.    3. HTN-  Continue lopressor    4. Hyperlipidemia-  Continue  statin    5. Hypernatremia-  Follow BMP    6. DVT prophylaxis.
78 y/o male w. PMHx of ?CHF, COPD, chr Afib not on AC due to GI bleed and hemoptysis, HTN, presented to the ED with progressively worsening SOB over the past couple of weeks. As per patient, his SOB progressively worsening and gradually gaining weight with leg swelling. He gained almost 30 lbs over couple of weeks. He denies any chest pain, palpitation, nausea, vomiting, abd pain.   He received 40 mg IV lasix, 125 mg IV solumedrol and 5 mg IV lasix in ED.       04/30/18: patient seen and examined. SOB and leg edema improving.   05/01/18: Patient seen and examined. He denies any new complaints. Discussed with patient and son at bed side regarding management plan including cardiac cath.   05/02/18: Patient seen and examined. S/P cath today with 90% LAD lesion as per Dr Monique. Discussed with Dr Monique, patient will need LAD PCI in tertiary center.       Vital Signs Last 24 Hrs  T(C): 36.3 (02 May 2018 11:00), Max: 37.1 (01 May 2018 21:32)  T(F): 97.4 (02 May 2018 11:00), Max: 98.7 (01 May 2018 21:32)  HR: 88 (02 May 2018 14:35) (55 - 110)  BP: 110/70 (02 May 2018 14:35) (96/73 - 117/71)  BP(mean): --  RR: 16 (02 May 2018 14:35) (16 - 18)  SpO2: 97% (02 May 2018 14:35) (91% - 98%)          Physical Exam:       CONSTITUTIONAL: Well appearing, well nourished, awake, alert, oriented to person, place, time/situation and in no apparent distress.  	· ENMT: Airway patent, Nasal mucosa clear. Mouth with normal mucosa. Throat has no vesicles, no oropharyngeal exudates and uvula is midline.  	· EYES: Clear bilaterally, pupils equal, round and reactive to light.  	· CARDIAC: +JVD, tachycardic, rapid, irregular  	· RESPIRATORY: b/l rhonchi.  	· GASTROINTESTINAL: Abdomen soft, non-tender, no guarding.  	· MUSCULOSKELETAL: 2+ pitting edema, erythema and scalenes to b/l LE's. Spine appears normal, range of motion is not limited, no muscle or joint tenderness  	· NEUROLOGICAL: Alert and oriented, no focal deficits, no motor or sensory deficits.  	· SKIN: Skin normal color for race, warm, dry and intact. No evidence of rash.  	· PSYCHIATRIC: Alert and oriented to person, place, time/situation. normal mood and affect. no apparent risk to self or others.  · HEME LYMPH: No adenopathy or splenomegaly. No cervical or inguinal lymphadenopathy.          Labs:                                        13.2   7.57  )-----------( 126      ( 01 May 2018 06:45 )             42.4     02 May 2018 07:25    146    |  109    |  35     ----------------------------<  69     3.7     |  31     |  1.29     Ca    9.1        02 May 2018 07:25          CAPILLARY BLOOD GLUCOSE                    MEDICATIONS:      MEDICATIONS  (STANDING):  aspirin 325 milliGRAM(s) Oral daily  furosemide   Injectable 40 milliGRAM(s) IV Push daily  heparin  Infusion.  Unit(s)/Hr (24 mL/Hr) IV Continuous <Continuous>  metoprolol succinate ER 75 milliGRAM(s) Oral daily  simvastatin 40 milliGRAM(s) Oral at bedtime    MEDICATIONS  (PRN):  heparin  Injectable 38721 Unit(s) IV Push every 6 hours PRN For aPTT less than 40  heparin  Injectable 5000 Unit(s) IV Push every 6 hours PRN For aPTT between 40 - 57          Assessment and Plan:   Assessment:  · Assessment		   78 y/o male w. PMHx of ?CHF, COPD, chr Afib not on AC due to GI bleed and hemoptysis, HTN, presented to the ED with progressively worsening SOB over the past couple of weeks. As per patient, his SOB progressively worsening and gradually gaining weight with leg swelling. He gained almost 30 lbs over couple of weeks. He denies any chest pain, palpitation, nausea, vomiting, abd pain.   He received 40 mg IV lasix, 125 mg IV solumedrol and 5 mg IV lasix in ED.      1. Acute CHF-systolic  Daily weight, fluid restrictions  Strict I/Os  Continue IV lasix, follow renal function while on lasix  Check 2D echo: EF 35%  Dr Castillo follow up appreciated  EP consult appreciated.      2. CAD   S/P cardiac cath: LAD critical stenosis  Start heparin drip and plavix  Needs PCI at tertiary center possibly on Friday    3. Chr A Fib with RVR  Continue lopressor and aspirin  Start heparin drip  Patient was on xarelto in the past, off now due to GI bleed and hemoptysis.    4. HTN-  Continue lopressor    5. Hyperlipidemia-  Continue  statin    6. Hypernatremia-  Follow BMP
Cardiology Progress Note    HPI: 78 y/o male w. PMHx of ?CHF, COPD, chr Afib not on AC due to GI bleed and hemoptysis, HTN, presented to the ED with progressively worsening SOB over the past couple of weeks. As per patient, his SOB progressively worsening and gradually gaining weight with leg swelling. He gained almost 30 lbs over couple of weeks. He denies any chest pain, palpitation, nausea, vomiting, abd pain.   He received 40 mg IV lasix, 125 mg IV solumedrol and 5 mg IV lasix in ED. (29 Apr 2018 13:11)    4/30- SOB improved. Afib on tele- . No CP. No fevers.    5/1- Case d/w pt at length. Echo findings d/w pt. +SOB, no CP. No events last pm. Afib  on tele.     5/2- Agreeable to [facility] now. No CP. SOB mildly improved. No events last pm.    PAST MEDICAL & SURGICAL HISTORY:  Chronic atrial fibrillation  HTN (hypertension), benign  No significant past surgical history    Allergies  No Known Allergies    SOCIAL HISTORY: Denies tobacco, etoh abuse or illicit drug use    FAMILY HISTORY: No pertinent family history in first degree relatives    MEDICATIONS  (STANDING):  aspirin 325 milliGRAM(s) Oral daily  enoxaparin Injectable 40 milliGRAM(s) SubCutaneous every 24 hours  furosemide   Injectable 40 milliGRAM(s) IV Push two times a day  metoprolol tartrate 50 milliGRAM(s) Oral two times a day  simvastatin 40 milliGRAM(s) Oral at bedtime    MEDICATIONS  (PRN):      Vital Signs Last 24 Hrs  T(C): 37.1 (30 Apr 2018 06:14), Max: 37.1 (30 Apr 2018 06:14)  T(F): 98.7 (30 Apr 2018 06:14), Max: 98.7 (30 Apr 2018 06:14)  HR: 86 (30 Apr 2018 06:14) (86 - 110)  BP: 115/84 (30 Apr 2018 06:14) (94/78 - 115/84)  BP(mean): --  RR: 18 (30 Apr 2018 06:14) (15 - 18)  SpO2: 94% (30 Apr 2018 06:14) (94% - 100%)    REVIEW OF SYSTEMS:    CONSTITUTIONAL:  As per HPI.  HEENT:  Eyes:  No diplopia or blurred vision. ENT:  No earache, sore throat or runny nose.  CARDIOVASCULAR:  No pressure, squeezing, strangling, tightness, heaviness or aching about the chest, neck, axilla or epigastrium.  RESPIRATORY: +SOB, DARLING  GASTROINTESTINAL:  No nausea, vomiting or diarrhea.  GENITOURINARY:  No dysuria, frequency or urgency.  MUSCULOSKELETAL:  As per HPI.  SKIN:  No change in skin, hair or nails.  NEUROLOGIC:  No paresthesias, fasciculations, seizures or weakness.    PHYSICAL EXAMINATION:    GENERAL APPEARANCE:  Pt. is not currently dyspneic, in no distress. Pt. is alert, oriented, and pleasant.  HEENT:  Pupils are normal and react normally. No icterus. Mucous membranes well colored.  NECK:  Supple. No lymphadenopathy. Jugular venous pressure not elevated. Carotids equal.   HEART:   The cardiac impulse has a normal quality. There are no murmurs, rubs or gallops noted, irregular rhythm  CHEST:  Decreased BS b/l  ABDOMEN:  Soft and nontender.   EXTREMITIES: 2-3+ LE edema.  SKIN:  b/l LE venous stasis changes.    I&O's Summary      LABS:                        13.1   9.02  )-----------( x        ( 30 Apr 2018 06:41 )             41.5     04-30    148<H>  |  114<H>  |  31<H>  ----------------------------<  108<H>  4.0   |  26  |  1.31<H>    Ca    8.9      30 Apr 2018 06:41  Mg     2.3     04-28    TPro  7.1  /  Alb  3.2<L>  /  TBili  0.6  /  DBili  x   /  AST  26  /  ALT  22  /  AlkPhos  104  04-28    LIVER FUNCTIONS - ( 28 Apr 2018 19:58 )  Alb: 3.2 g/dL / Pro: 7.1 gm/dL / ALK PHOS: 104 U/L / ALT: 22 U/L / AST: 26 U/L / GGT: x           PT/INR - ( 28 Apr 2018 19:58 )   PT: 10.9 sec;   INR: 1.01 ratio      CARDIAC MARKERS ( 28 Apr 2018 23:33 )  <0.015 ng/mL / x     / x     / x     / x      CARDIAC MARKERS ( 28 Apr 2018 19:58 )  <0.015 ng/mL / x     / x     / x     / x        EKG: < from: 12 Lead ECG (04.28.18 @ 19:31) >  Atrial fibrillation with rapid ventricular response with premature ventricular or aberrantly conducted complexes  Left axis deviation  Non-specific intra-ventricular conduction delay  Marked ST abnormality, possible lateral subendocardial injury    TELEMETRY: Afib     CARDIAC TESTS: < from: Transthoracic Echocardiogram (04.30.18 @ 14:07) >  The left ventricle cavity is mildly dilated.   Global LV dysfunction. Endocardium not always well seen. Wall motion   abnormalities noted. Visual estimation of left ventricle ejection   fraction   is 35-40%.   The left atrium is mildly dilated.   Normal appearing right atrium.   Normal appearing right ventricle function.   There are fibrocalcific changes noted to the aortic valve leaflets with   restriction in leaflet excursion. doppler gradients c/w mild aortic   stenosis. However, transaortic gradients are underestimated due to   impaired left ventricle systolic function. Thus AS maybe more severe   than   indicated by doppler gradient.   Mild (1+) mitral regurgitation is present.   Trace tricuspid valve regurgitation is present.   Pulmonic valve not well seen.   No evidence of pericardial effusion.   No pleural comment made.   The IVC is dilated.    < end of copied text >      RADIOLOGY & ADDITIONAL STUDIES: < from: Xray Chest 1 View- PORTABLE-Routine (04.28.18 @ 20:31) >    Small bilateral pleural effusions with bibasilar atelectasis and or   pneumonia.    ASSESSMENT & PLAN:
76 y/o male w. PMHx of ?CHF, COPD, chr Afib not on AC due to GI bleed and hemoptysis, HTN, presented to the ED with progressively worsening SOB over the past couple of weeks. As per patient, his SOB progressively worsening and gradually gaining weight with leg swelling. He gained almost 30 lbs over couple of weeks. He denies any chest pain, palpitation, nausea, vomiting, abd pain.   He received 40 mg IV lasix, 125 mg IV solumedrol and 5 mg IV lasix in ED.       04/30/18: patient seen and examined. SOB and leg edema improving.   05/01/18: Patient seen and examined. He denies any new complaints. Discussed with patient and son at bed side regarding management plan including cardiac cath.   05/02/18: Patient seen and examined. S/P cath today with 90% LAD lesion as per Dr Monique. Discussed with Dr Monique, patient will need LAD PCI in tertiary center.   05/03/18: patient seen and examined. No sob, some nose bleed, better now. Discussed with patient regarding management plan. Possible transfer to [place] tomorrow for cardiac intervention, PCI to LAD      Vital Signs Last 24 Hrs  T(C): 36.1 (03 May 2018 10:21), Max: 36.5 (03 May 2018 04:41)  T(F): 97 (03 May 2018 10:21), Max: 97.7 (03 May 2018 04:41)  HR: 98 (03 May 2018 10:21) (98 - 108)  BP: 100/62 (03 May 2018 10:21) (100/62 - 114/65)  BP(mean): --  RR: 18 (03 May 2018 10:21) (17 - 18)  SpO2: 91% (03 May 2018 10:21) (91% - 93%)          Physical Exam:       CONSTITUTIONAL: Well appearing, well nourished, awake, alert, oriented to person, place, time/situation and in no apparent distress.  	· ENMT: Airway patent, Nasal mucosa clear. Mouth with normal mucosa. Throat has no vesicles, no oropharyngeal exudates and uvula is midline.  	· EYES: Clear bilaterally, pupils equal, round and reactive to light.  	· CARDIAC: +JVD, tachycardic, rapid, irregular  	· RESPIRATORY: b/l rhonchi.  	· GASTROINTESTINAL: Abdomen soft, non-tender, no guarding.  	· MUSCULOSKELETAL: 2+ pitting edema, erythema and scalenes to b/l LE's. Spine appears normal, range of motion is not limited, no muscle or joint tenderness  	· NEUROLOGICAL: Alert and oriented, no focal deficits, no motor or sensory deficits.  	· SKIN: Skin normal color for race, warm, dry and intact. No evidence of rash.  	· PSYCHIATRIC: Alert and oriented to person, place, time/situation. normal mood and affect. no apparent risk to self or others.  · HEME LYMPH: No adenopathy or splenomegaly. No cervical or inguinal lymphadenopathy.          Labs:                                                 13.0   6.37  )-----------( 123      ( 03 May 2018 06:44 )             40.8     03 May 2018 06:44    146    |  109    |  35     ----------------------------<  76     3.7     |  29     |  0.99     Ca    8.9        03 May 2018 06:44        PTT - ( 03 May 2018 08:16 )  PTT:> 200 sec  CAPILLARY BLOOD GLUCOSE            MEDICATIONS:      MEDICATIONS  (STANDING):  aspirin 325 milliGRAM(s) Oral daily  clopidogrel Tablet 75 milliGRAM(s) Oral daily  furosemide   Injectable 40 milliGRAM(s) IV Push daily  heparin  Infusion.  Unit(s)/Hr (24 mL/Hr) IV Continuous <Continuous>  metoprolol succinate ER 75 milliGRAM(s) Oral daily  simvastatin 40 milliGRAM(s) Oral at bedtime    MEDICATIONS  (PRN):  heparin  Injectable 64621 Unit(s) IV Push every 6 hours PRN For aPTT less than 40  heparin  Injectable 5000 Unit(s) IV Push every 6 hours PRN For aPTT between 40 - 57            Assessment and Plan:   Assessment:  · Assessment		   76 y/o male w. PMHx of ?CHF, COPD, chr Afib not on AC due to GI bleed and hemoptysis, HTN, presented to the ED with progressively worsening SOB over the past couple of weeks. As per patient, his SOB progressively worsening and gradually gaining weight with leg swelling. He gained almost 30 lbs over couple of weeks. He denies any chest pain, palpitation, nausea, vomiting, abd pain.   He received 40 mg IV lasix, 125 mg IV solumedrol and 5 mg IV lasix in ED.      1. Acute CHF-systolic  Clinically resolving  Daily weight, fluid restrictions  Strict I/Os  Continue IV lasix, follow renal function while on lasix  2D echo: EF 35%  Dr Castillo follow up appreciated  EP consult appreciated.      2. CAD   S/P cardiac cath: LAD critical stenosis  Started heparin drip and plavix  Needs PCI at tertiary center possibly tomorrow    3. Chr A Fib with RVR  Continue lopressor and aspirin  on heparin drip  Patient was on xarelto in the past, off now due to GI bleed and hemoptysis.    4. HTN-  Continue lopressor    5. Hyperlipidemia-  Continue  statin    6. Hypernatremia-  Follow BMP    7. Disposition-possible transfer to [facility] tomorrow for PCI to LAD
Called earlier to see the patient because he was continuing to have seizures and his NH3+ that was over 500.      Started a Versed drip and lactulose 45 gm q3h.        The remainder of the family arrived and I had a meeting with with the family.  They are all in agreement that their father sis not want to be on a respirator at this stage did not want aggressive care.  They all agree tho terminally wean the patient from the ventilator in accordance with the patients known prior wishes.      Will D/C all meds  Will have a morphine drip available for comfort  MS q1h PRN  Will call a  for last rights as well.      Add 45 min
Cardiology Progress Note    HPI: 76 y/o male w. PMHx of ?CHF, COPD, chr Afib not on AC due to GI bleed and hemoptysis, HTN, presented to the ED with progressively worsening SOB over the past couple of weeks. As per patient, his SOB progressively worsening and gradually gaining weight with leg swelling. He gained almost 30 lbs over couple of weeks. He denies any chest pain, palpitation, nausea, vomiting, abd pain.   He received 40 mg IV lasix, 125 mg IV solumedrol and 5 mg IV lasix in ED. (29 Apr 2018 13:11)    4/30- SOB improved. Afib on tele- . No CP. No fevers.    5/1- Case d/w pt at length. Echo findings d/w pt. +SOB, no CP. No events last pm. Afib  on tele.     5/2- Agreeable to [facility] now. No CP. SOB mildly improved. No events last pm.    5/4- Pt lethargic, obtunded today. LHC with LAD lesion. No CP. +SOB.    5/5- Pt tx to CCU yesterday due to unresponsiveness. RRT for patient minimally responsive, abg did not show co2 retention on 5/40, MRI showed no stroke. Patient intubated for airway protection, had seizures overnight and developed hematuria, dec usine output, inc alctate. Case d/w Nephrology, Intensivist, Neurology and nursing. Now on abx as well.     PAST MEDICAL & SURGICAL HISTORY:  Chronic atrial fibrillation  HTN (hypertension), benign  No significant past surgical history    Allergies  No Known Allergies    SOCIAL HISTORY: Denies tobacco, etoh abuse or illicit drug use    FAMILY HISTORY: No pertinent family history in first degree relatives    MEDICATIONS  (STANDING):  aspirin 325 milliGRAM(s) Oral daily  enoxaparin Injectable 40 milliGRAM(s) SubCutaneous every 24 hours  furosemide   Injectable 40 milliGRAM(s) IV Push two times a day  metoprolol tartrate 50 milliGRAM(s) Oral two times a day  simvastatin 40 milliGRAM(s) Oral at bedtime    MEDICATIONS  (PRN):    Vital Signs Last 24 Hrs  T(C): 37.1 (30 Apr 2018 06:14), Max: 37.1 (30 Apr 2018 06:14)  T(F): 98.7 (30 Apr 2018 06:14), Max: 98.7 (30 Apr 2018 06:14)  HR: 86 (30 Apr 2018 06:14) (86 - 110)  BP: 115/84 (30 Apr 2018 06:14) (94/78 - 115/84)  BP(mean): --  RR: 18 (30 Apr 2018 06:14) (15 - 18)  SpO2: 94% (30 Apr 2018 06:14) (94% - 100%)    REVIEW OF SYSTEMS:    CONSTITUTIONAL:  As per HPI.  HEENT:  Eyes:  No diplopia or blurred vision. ENT:  No earache, sore throat or runny nose.  CARDIOVASCULAR:  No pressure, squeezing, strangling, tightness, heaviness or aching about the chest, neck, axilla or epigastrium.  RESPIRATORY: +SOB, DARLING  GASTROINTESTINAL:  No nausea, vomiting or diarrhea.  GENITOURINARY:  No dysuria, frequency or urgency.  MUSCULOSKELETAL:  As per HPI.  SKIN:  No change in skin, hair or nails.  NEUROLOGIC:  No paresthesias, fasciculations, seizures or weakness.    PHYSICAL EXAMINATION:    GENERAL APPEARANCE: Lethargic.  HEENT:  Pupils are normal and react normally. No icterus. Mucous membranes well colored.  NECK:  Supple. No lymphadenopathy. Jugular venous pressure not elevated. Carotids equal.   HEART:   The cardiac impulse has a normal quality. There are no murmurs, rubs or gallops noted, irregular rhythm  CHEST:  Decreased BS b/l  ABDOMEN:  Soft and nontender.   EXTREMITIES: 2-3+ LE edema.  SKIN:  b/l LE venous stasis changes.    I&O's Summary      LABS:                        13.1   9.02  )-----------( x        ( 30 Apr 2018 06:41 )             41.5     04-30    148<H>  |  114<H>  |  31<H>  ----------------------------<  108<H>  4.0   |  26  |  1.31<H>    Ca    8.9      30 Apr 2018 06:41  Mg     2.3     04-28    TPro  7.1  /  Alb  3.2<L>  /  TBili  0.6  /  DBili  x   /  AST  26  /  ALT  22  /  AlkPhos  104  04-28    LIVER FUNCTIONS - ( 28 Apr 2018 19:58 )  Alb: 3.2 g/dL / Pro: 7.1 gm/dL / ALK PHOS: 104 U/L / ALT: 22 U/L / AST: 26 U/L / GGT: x           PT/INR - ( 28 Apr 2018 19:58 )   PT: 10.9 sec;   INR: 1.01 ratio      CARDIAC MARKERS ( 28 Apr 2018 23:33 )  <0.015 ng/mL / x     / x     / x     / x      CARDIAC MARKERS ( 28 Apr 2018 19:58 )  <0.015 ng/mL / x     / x     / x     / x        EKG: < from: 12 Lead ECG (04.28.18 @ 19:31) >  Atrial fibrillation with rapid ventricular response with premature ventricular or aberrantly conducted complexes  Left axis deviation  Non-specific intra-ventricular conduction delay  Marked ST abnormality, possible lateral subendocardial injury    TELEMETRY: Afib 100-140s.    CARDIAC TESTS: < from: Transthoracic Echocardiogram (04.30.18 @ 14:07) >  The left ventricle cavity is mildly dilated.   Global LV dysfunction. Endocardium not always well seen. Wall motion   abnormalities noted. Visual estimation of left ventricle ejection   fraction   is 35-40%.   The left atrium is mildly dilated.   Normal appearing right atrium.   Normal appearing right ventricle function.   There are fibrocalcific changes noted to the aortic valve leaflets with   restriction in leaflet excursion. doppler gradients c/w mild aortic   stenosis. However, transaortic gradients are underestimated due to   impaired left ventricle systolic function. Thus AS maybe more severe   than   indicated by doppler gradient.   Mild (1+) mitral regurgitation is present.   Trace tricuspid valve regurgitation is present.   Pulmonic valve not well seen.   No evidence of pericardial effusion.   No pleural comment made.   The IVC is dilated.    RADIOLOGY & ADDITIONAL STUDIES: < from: Xray Chest 1 View- PORTABLE-Routine (04.28.18 @ 20:31) >    Small bilateral pleural effusions with bibasilar atelectasis and or   pneumonia.    ASSESSMENT & PLAN:
Cardiology Progress Note    HPI: 76 y/o male w. PMHx of ?CHF, COPD, chr Afib not on AC due to GI bleed and hemoptysis, HTN, presented to the ED with progressively worsening SOB over the past couple of weeks. As per patient, his SOB progressively worsening and gradually gaining weight with leg swelling. He gained almost 30 lbs over couple of weeks. He denies any chest pain, palpitation, nausea, vomiting, abd pain.   He received 40 mg IV lasix, 125 mg IV solumedrol and 5 mg IV lasix in ED. (29 Apr 2018 13:11)    Pt today denies any SOB or CP at rest.  He was having nose bleed today evening when I saw him to a mild degree.     PAST MEDICAL & SURGICAL HISTORY:  Chronic atrial fibrillation  HTN (hypertension), benign  No significant past surgical history    Allergies  No Known Allergies    SOCIAL HISTORY: Denies tobacco, etoh abuse or illicit drug use    FAMILY HISTORY: No pertinent family history in first degree relatives    MEDICATIONS  (STANDING):  aspirin 325 milliGRAM(s) Oral daily  enoxaparin Injectable 40 milliGRAM(s) SubCutaneous every 24 hours  furosemide   Injectable 40 milliGRAM(s) IV Push two times a day  metoprolol tartrate 50 milliGRAM(s) Oral two times a day  simvastatin 40 milliGRAM(s) Oral at bedtime    MEDICATIONS  (PRN):      Vital Signs Last 24 Hrs  T(C): 37.1 (30 Apr 2018 06:14), Max: 37.1 (30 Apr 2018 06:14)  T(F): 98.7 (30 Apr 2018 06:14), Max: 98.7 (30 Apr 2018 06:14)  HR: 86 (30 Apr 2018 06:14) (86 - 110)  BP: 115/84 (30 Apr 2018 06:14) (94/78 - 115/84)  BP(mean): --  RR: 18 (30 Apr 2018 06:14) (15 - 18)  SpO2: 94% (30 Apr 2018 06:14) (94% - 100%)    REVIEW OF SYSTEMS:    CONSTITUTIONAL:  As per HPI.  HEENT:  Eyes:  No diplopia or blurred vision. ENT:  No earache, sore throat or runny nose.  CARDIOVASCULAR:  No pressure, squeezing, strangling, tightness, heaviness or aching about the chest, neck, axilla or epigastrium.  RESPIRATORY: +SOB, DARLING  GASTROINTESTINAL:  No nausea, vomiting or diarrhea.  GENITOURINARY:  No dysuria, frequency or urgency.  MUSCULOSKELETAL:  As per HPI.  SKIN:  No change in skin, hair or nails.  NEUROLOGIC:  No paresthesias, fasciculations, seizures or weakness.    PHYSICAL EXAMINATION:    GENERAL APPEARANCE:  Pt. is not currently dyspneic, in no distress. Pt. is alert, oriented, and pleasant.  HEENT:  Pupils are normal and react normally. No icterus. Mucous membranes well colored.  NECK:  Supple. No lymphadenopathy. Jugular venous pressure not elevated. Carotids equal.   HEART:   The cardiac impulse has a normal quality. There are no murmurs, rubs or gallops noted, irregular rhythm  CHEST:  Decreased BS b/l  ABDOMEN:  Soft and nontender.   EXTREMITIES: 2-3+ LE edema.  SKIN:  b/l LE venous stasis changes.    I&O's Summary      LABS:                        13.1   9.02  )-----------( x        ( 30 Apr 2018 06:41 )             41.5     04-30    148<H>  |  114<H>  |  31<H>  ----------------------------<  108<H>  4.0   |  26  |  1.31<H>    Ca    8.9      30 Apr 2018 06:41  Mg     2.3     04-28    TPro  7.1  /  Alb  3.2<L>  /  TBili  0.6  /  DBili  x   /  AST  26  /  ALT  22  /  AlkPhos  104  04-28    LIVER FUNCTIONS - ( 28 Apr 2018 19:58 )  Alb: 3.2 g/dL / Pro: 7.1 gm/dL / ALK PHOS: 104 U/L / ALT: 22 U/L / AST: 26 U/L / GGT: x           PT/INR - ( 28 Apr 2018 19:58 )   PT: 10.9 sec;   INR: 1.01 ratio      CARDIAC MARKERS ( 28 Apr 2018 23:33 )  <0.015 ng/mL / x     / x     / x     / x      CARDIAC MARKERS ( 28 Apr 2018 19:58 )  <0.015 ng/mL / x     / x     / x     / x        EKG: < from: 12 Lead ECG (04.28.18 @ 19:31) >  Atrial fibrillation with rapid ventricular response with premature ventricular or aberrantly conducted complexes  Left axis deviation  Non-specific intra-ventricular conduction delay  Marked ST abnormality, possible lateral subendocardial injury    TELEMETRY: Afib     CARDIAC TESTS: < from: Transthoracic Echocardiogram (04.30.18 @ 14:07) >  The left ventricle cavity is mildly dilated.   Global LV dysfunction. Endocardium not always well seen. Wall motion   abnormalities noted. Visual estimation of left ventricle ejection   fraction   is 35-40%.   The left atrium is mildly dilated.   Normal appearing right atrium.   Normal appearing right ventricle function.   There are fibrocalcific changes noted to the aortic valve leaflets with   restriction in leaflet excursion. doppler gradients c/w mild aortic   stenosis. However, transaortic gradients are underestimated due to   impaired left ventricle systolic function. Thus AS maybe more severe   than   indicated by doppler gradient.   Mild (1+) mitral regurgitation is present.   Trace tricuspid valve regurgitation is present.   Pulmonic valve not well seen.   No evidence of pericardial effusion.   No pleural comment made.   The IVC is dilated.    < end of copied text >      RADIOLOGY & ADDITIONAL STUDIES: < from: Xray Chest 1 View- PORTABLE-Routine (04.28.18 @ 20:31) >    Small bilateral pleural effusions with bibasilar atelectasis and or   pneumonia.    ASSESSMENT & PLAN:
Cardiology Progress Note    HPI: 78 y/o male w. PMHx of ?CHF, COPD, chr Afib not on AC due to GI bleed and hemoptysis, HTN, presented to the ED with progressively worsening SOB over the past couple of weeks. As per patient, his SOB progressively worsening and gradually gaining weight with leg swelling. He gained almost 30 lbs over couple of weeks. He denies any chest pain, palpitation, nausea, vomiting, abd pain.   He received 40 mg IV lasix, 125 mg IV solumedrol and 5 mg IV lasix in ED. (29 Apr 2018 13:11)    4/30- SOB improved. Afib on tele- . No CP. No fevers.    5/1- Case d/w pt at length. Echo findings d/w pt. +SOB, no CP. No events last pm. Afib  on tele.     5/2- Agreeable to [facility] now. No CP. SOB mildly improved. No events last pm.    5/4- Pt lethargic, obtunded today. [facility] with LAD lesion. No CP. +SOB.    PAST MEDICAL & SURGICAL HISTORY:  Chronic atrial fibrillation  HTN (hypertension), benign  No significant past surgical history    Allergies  No Known Allergies    SOCIAL HISTORY: Denies tobacco, etoh abuse or illicit drug use    FAMILY HISTORY: No pertinent family history in first degree relatives    MEDICATIONS  (STANDING):  aspirin 325 milliGRAM(s) Oral daily  enoxaparin Injectable 40 milliGRAM(s) SubCutaneous every 24 hours  furosemide   Injectable 40 milliGRAM(s) IV Push two times a day  metoprolol tartrate 50 milliGRAM(s) Oral two times a day  simvastatin 40 milliGRAM(s) Oral at bedtime    MEDICATIONS  (PRN):      Vital Signs Last 24 Hrs  T(C): 37.1 (30 Apr 2018 06:14), Max: 37.1 (30 Apr 2018 06:14)  T(F): 98.7 (30 Apr 2018 06:14), Max: 98.7 (30 Apr 2018 06:14)  HR: 86 (30 Apr 2018 06:14) (86 - 110)  BP: 115/84 (30 Apr 2018 06:14) (94/78 - 115/84)  BP(mean): --  RR: 18 (30 Apr 2018 06:14) (15 - 18)  SpO2: 94% (30 Apr 2018 06:14) (94% - 100%)    REVIEW OF SYSTEMS:    CONSTITUTIONAL:  As per HPI.  HEENT:  Eyes:  No diplopia or blurred vision. ENT:  No earache, sore throat or runny nose.  CARDIOVASCULAR:  No pressure, squeezing, strangling, tightness, heaviness or aching about the chest, neck, axilla or epigastrium.  RESPIRATORY: +SOB, DARLING  GASTROINTESTINAL:  No nausea, vomiting or diarrhea.  GENITOURINARY:  No dysuria, frequency or urgency.  MUSCULOSKELETAL:  As per HPI.  SKIN:  No change in skin, hair or nails.  NEUROLOGIC:  No paresthesias, fasciculations, seizures or weakness.    PHYSICAL EXAMINATION:    GENERAL APPEARANCE: Lethargic.  HEENT:  Pupils are normal and react normally. No icterus. Mucous membranes well colored.  NECK:  Supple. No lymphadenopathy. Jugular venous pressure not elevated. Carotids equal.   HEART:   The cardiac impulse has a normal quality. There are no murmurs, rubs or gallops noted, irregular rhythm  CHEST:  Decreased BS b/l  ABDOMEN:  Soft and nontender.   EXTREMITIES: 2-3+ LE edema.  SKIN:  b/l LE venous stasis changes.    I&O's Summary      LABS:                        13.1   9.02  )-----------( x        ( 30 Apr 2018 06:41 )             41.5     04-30    148<H>  |  114<H>  |  31<H>  ----------------------------<  108<H>  4.0   |  26  |  1.31<H>    Ca    8.9      30 Apr 2018 06:41  Mg     2.3     04-28    TPro  7.1  /  Alb  3.2<L>  /  TBili  0.6  /  DBili  x   /  AST  26  /  ALT  22  /  AlkPhos  104  04-28    LIVER FUNCTIONS - ( 28 Apr 2018 19:58 )  Alb: 3.2 g/dL / Pro: 7.1 gm/dL / ALK PHOS: 104 U/L / ALT: 22 U/L / AST: 26 U/L / GGT: x           PT/INR - ( 28 Apr 2018 19:58 )   PT: 10.9 sec;   INR: 1.01 ratio      CARDIAC MARKERS ( 28 Apr 2018 23:33 )  <0.015 ng/mL / x     / x     / x     / x      CARDIAC MARKERS ( 28 Apr 2018 19:58 )  <0.015 ng/mL / x     / x     / x     / x        EKG: < from: 12 Lead ECG (04.28.18 @ 19:31) >  Atrial fibrillation with rapid ventricular response with premature ventricular or aberrantly conducted complexes  Left axis deviation  Non-specific intra-ventricular conduction delay  Marked ST abnormality, possible lateral subendocardial injury    TELEMETRY: Afib 100-140s.    CARDIAC TESTS: < from: Transthoracic Echocardiogram (04.30.18 @ 14:07) >  The left ventricle cavity is mildly dilated.   Global LV dysfunction. Endocardium not always well seen. Wall motion   abnormalities noted. Visual estimation of left ventricle ejection   fraction   is 35-40%.   The left atrium is mildly dilated.   Normal appearing right atrium.   Normal appearing right ventricle function.   There are fibrocalcific changes noted to the aortic valve leaflets with   restriction in leaflet excursion. doppler gradients c/w mild aortic   stenosis. However, transaortic gradients are underestimated due to   impaired left ventricle systolic function. Thus AS maybe more severe   than   indicated by doppler gradient.   Mild (1+) mitral regurgitation is present.   Trace tricuspid valve regurgitation is present.   Pulmonic valve not well seen.   No evidence of pericardial effusion.   No pleural comment made.   The IVC is dilated.    < end of copied text >      RADIOLOGY & ADDITIONAL STUDIES: < from: Xray Chest 1 View- PORTABLE-Routine (04.28.18 @ 20:31) >    Small bilateral pleural effusions with bibasilar atelectasis and or   pneumonia.    ASSESSMENT & PLAN:
Cardiology Progress Note    HPI: 78 y/o male w. PMHx of ?CHF, COPD, chr Afib not on AC due to GI bleed and hemoptysis, HTN, presented to the ED with progressively worsening SOB over the past couple of weeks. As per patient, his SOB progressively worsening and gradually gaining weight with leg swelling. He gained almost 30 lbs over couple of weeks. He denies any chest pain, palpitation, nausea, vomiting, abd pain.   He received 40 mg IV lasix, 125 mg IV solumedrol and 5 mg IV lasix in ED. (29 Apr 2018 13:11)    4/30- SOB improved. Afib on tele- . No CP. No fevers.    5/1- Case d/w pt at length. Echo findings d/w pt. +SOB, no CP. No events last pm. Afib  on tele.     PAST MEDICAL & SURGICAL HISTORY:  Chronic atrial fibrillation  HTN (hypertension), benign  No significant past surgical history    Allergies  No Known Allergies    SOCIAL HISTORY: Denies tobacco, etoh abuse or illicit drug use    FAMILY HISTORY: No pertinent family history in first degree relatives    MEDICATIONS  (STANDING):  aspirin 325 milliGRAM(s) Oral daily  enoxaparin Injectable 40 milliGRAM(s) SubCutaneous every 24 hours  furosemide   Injectable 40 milliGRAM(s) IV Push two times a day  metoprolol tartrate 50 milliGRAM(s) Oral two times a day  simvastatin 40 milliGRAM(s) Oral at bedtime    MEDICATIONS  (PRN):      Vital Signs Last 24 Hrs  T(C): 37.1 (30 Apr 2018 06:14), Max: 37.1 (30 Apr 2018 06:14)  T(F): 98.7 (30 Apr 2018 06:14), Max: 98.7 (30 Apr 2018 06:14)  HR: 86 (30 Apr 2018 06:14) (86 - 110)  BP: 115/84 (30 Apr 2018 06:14) (94/78 - 115/84)  BP(mean): --  RR: 18 (30 Apr 2018 06:14) (15 - 18)  SpO2: 94% (30 Apr 2018 06:14) (94% - 100%)    REVIEW OF SYSTEMS:    CONSTITUTIONAL:  As per HPI.  HEENT:  Eyes:  No diplopia or blurred vision. ENT:  No earache, sore throat or runny nose.  CARDIOVASCULAR:  No pressure, squeezing, strangling, tightness, heaviness or aching about the chest, neck, axilla or epigastrium.  RESPIRATORY: +SOB, DARLING  GASTROINTESTINAL:  No nausea, vomiting or diarrhea.  GENITOURINARY:  No dysuria, frequency or urgency.  MUSCULOSKELETAL:  As per HPI.  SKIN:  No change in skin, hair or nails.  NEUROLOGIC:  No paresthesias, fasciculations, seizures or weakness.    PHYSICAL EXAMINATION:    GENERAL APPEARANCE:  Pt. is not currently dyspneic, in no distress. Pt. is alert, oriented, and pleasant.  HEENT:  Pupils are normal and react normally. No icterus. Mucous membranes well colored.  NECK:  Supple. No lymphadenopathy. Jugular venous pressure not elevated. Carotids equal.   HEART:   The cardiac impulse has a normal quality. There are no murmurs, rubs or gallops noted, irregular rhythm  CHEST:  Decreased BS b/l  ABDOMEN:  Soft and nontender.   EXTREMITIES: 2-3+ LE edema.  SKIN:  b/l LE venous stasis changes.    I&O's Summary      LABS:                        13.1   9.02  )-----------( x        ( 30 Apr 2018 06:41 )             41.5     04-30    148<H>  |  114<H>  |  31<H>  ----------------------------<  108<H>  4.0   |  26  |  1.31<H>    Ca    8.9      30 Apr 2018 06:41  Mg     2.3     04-28    TPro  7.1  /  Alb  3.2<L>  /  TBili  0.6  /  DBili  x   /  AST  26  /  ALT  22  /  AlkPhos  104  04-28    LIVER FUNCTIONS - ( 28 Apr 2018 19:58 )  Alb: 3.2 g/dL / Pro: 7.1 gm/dL / ALK PHOS: 104 U/L / ALT: 22 U/L / AST: 26 U/L / GGT: x           PT/INR - ( 28 Apr 2018 19:58 )   PT: 10.9 sec;   INR: 1.01 ratio      CARDIAC MARKERS ( 28 Apr 2018 23:33 )  <0.015 ng/mL / x     / x     / x     / x      CARDIAC MARKERS ( 28 Apr 2018 19:58 )  <0.015 ng/mL / x     / x     / x     / x        EKG: < from: 12 Lead ECG (04.28.18 @ 19:31) >  Atrial fibrillation with rapid ventricular response with premature ventricular or aberrantly conducted complexes  Left axis deviation  Non-specific intra-ventricular conduction delay  Marked ST abnormality, possible lateral subendocardial injury    TELEMETRY: Afib     CARDIAC TESTS: < from: Transthoracic Echocardiogram (04.30.18 @ 14:07) >  The left ventricle cavity is mildly dilated.   Global LV dysfunction. Endocardium not always well seen. Wall motion   abnormalities noted. Visual estimation of left ventricle ejection   fraction   is 35-40%.   The left atrium is mildly dilated.   Normal appearing right atrium.   Normal appearing right ventricle function.   There are fibrocalcific changes noted to the aortic valve leaflets with   restriction in leaflet excursion. doppler gradients c/w mild aortic   stenosis. However, transaortic gradients are underestimated due to   impaired left ventricle systolic function. Thus AS maybe more severe   than   indicated by doppler gradient.   Mild (1+) mitral regurgitation is present.   Trace tricuspid valve regurgitation is present.   Pulmonic valve not well seen.   No evidence of pericardial effusion.   No pleural comment made.   The IVC is dilated.    < end of copied text >      RADIOLOGY & ADDITIONAL STUDIES: < from: Xray Chest 1 View- PORTABLE-Routine (04.28.18 @ 20:31) >    Small bilateral pleural effusions with bibasilar atelectasis and or   pneumonia.    ASSESSMENT & PLAN:
Events Overnight Patient with seizures overnight with facial twitching, has also required increased dose of pressures for bp support                            MRI - no stroke, no source of infection    HPI:             78 y/o male w. PMHx of ?CHF, COPD, chr Afib , HTN, presented to the ED with progressively worsening SOB .      Patient had cardiac catheterizaion showing LAD lesion was going to be transferred [facility] for intervention       RRT for patient minimally responsive, abg did not show co2 retention on 5/40, MRI showed no stroke        Patient intubated for airway protection, had seizures overnight        developed hematuria, dec Cloudy.fr [facility] alct[facility]              MEDICATIONS  (STANDING):  aspirin 325 milliGRAM(s) Oral daily  clopidogrel Tablet 75 milliGRAM(s) Oral daily  digoxin     Tablet 0.125 milliGRAM(s) Oral every other day  levETIRAcetam  IVPB 750 milliGRAM(s) IV Intermittent every 12 hours  LORazepam   Injectable 1 milliGRAM(s) IV Push daily  metoprolol succinate ER 75 milliGRAM(s) Oral daily  norepinephrine Infusion 0.2 MICROgram(s)/kG/Min (28.144 mL/Hr) IV Continuous <Continuous>  phenylephrine    Infusion 0.5 MICROgram(s)/kG/Min (28.144 mL/Hr) IV Continuous <Continuous>  simvastatin 40 milliGRAM(s) Oral at bedtime  sodium chloride 0.45%. 1000 milliLiter(s) (100 mL/Hr) IV Continuous <Continuous>  sodium chloride 0.9% Bolus 250 milliLiter(s) IV Bolus once    MEDICATIONS  (PRN):  aluminum hydroxide/magnesium hydroxide/simethicone Suspension 30 milliLiter(s) Oral every 6 hours PRN Dyspepsia  ondansetron Injectable 8 milliGRAM(s) IV Push every 8 hours PRN Nausea and/or Vomiting            ICU Vital Signs Last 24 Hrs  T(C): 37.3 (05 May 2018 06:39), Max: 37.3 (05 May 2018 06:39)  T(F): 99.2 (05 May 2018 06:39), Max: 99.2 (05 May 2018 06:39)  HR: 108 (05 May 2018 08:55) (99 - 120)  BP: 83/50 (05 May 2018 06:30) (79/39 - 105/53)  BP(mean): 58 (05 May 2018 06:30) (49 - 86)  ABP: --  ABP(mean): --  RR: 22 (05 May 2018 06:30) (19 - 42)  SpO2: 97% (05 May 2018 08:55) (77% - 100%)      Mode: AC/ CMV (Assist Control/ Continuous Mandatory Ventilation)  RR (machine): 12  TV (machine): 550  FiO2: 100  PEEP: 5  ITime: 1  PIP: 23      I&O's Summary    04 May 2018 07:01  -  05 May 2018 07:00  --------------------------------------------------------  IN: 2525 mL / OUT: 2025 mL / NET: 500 mL        Physical Exam:    General - obese ill  HEENT orally intubaed  Neuro pupils reative, witnessed twitching of right side of face  cv irreg, irreg  abdomen distended tympanitic, non tender  ext 2 plus edema                          10.7   7.51  )-----------( 136      ( 05 May 2018 05:33 )             35.5       05-05    154<H>  |  124<H>  |  71<H>  ----------------------------<  51<L>  3.7   |  16<L>  |  2.01<H>    Ca    5.2<LL>      05 May 2018 05:33        CARDIAC MARKERS ( 04 May 2018 16:04 )  0.069 ng/mL / x     / x     / x     / x      CARDIAC MARKERS ( 04 May 2018 13:10 )  0.056 ng/mL / x     / x     / x     / x            ABG - ( 04 May 2018 09:49 )  pH, Arterial: 7.42  pH, Blood: x     /  pCO2: 34    /  pO2: 181   / HCO3: 21    / Base Excess: -2.1  /  SaO2: 96
responded to RRT     HPI:      78 y/o male w. PMHx of ?CHF, COPD, chr Afib , HTN, presented to the ED with progressively worsening SOB .      Patient had cardiac catheterizaion showing LAD lesion was going to be transferred [facility] for intervention      Patient on Heparin, aspirin, plavix.      RRT for patient minimally responsive, abg did not show co2 retention      Patient transferred to CCU for  intubation.      inc wbc, creatinine inc to 1.9    MEDICATIONS  (STANDING):  aspirin 325 milliGRAM(s) Oral daily  clopidogrel Tablet 75 milliGRAM(s) Oral daily  heparin  Infusion.  Unit(s)/Hr (24 mL/Hr) IV Continuous <Continuous>  metoprolol succinate ER 75 milliGRAM(s) Oral daily  pantoprazole  Injectable 40 milliGRAM(s) IV Push once  simvastatin 40 milliGRAM(s) Oral at bedtime  sodium chloride 0.9% Bolus 250 milliLiter(s) IV Bolus once    MEDICATIONS  (PRN):  aluminum hydroxide/magnesium hydroxide/simethicone Suspension 30 milliLiter(s) Oral every 6 hours PRN Dyspepsia  heparin  Injectable 75703 Unit(s) IV Push every 6 hours PRN For aPTT less than 40  heparin  Injectable 5000 Unit(s) IV Push every 6 hours PRN For aPTT between 40 - 57  ondansetron Injectable 8 milliGRAM(s) IV Push every 8 hours PRN Nausea and/or Vomiting            ICU Vital Signs Last 24 Hrs  T(C): 36.7 (04 May 2018 04:45), Max: 36.8 (03 May 2018 20:58)  T(F): 98.1 (04 May 2018 04:45), Max: 98.3 (03 May 2018 20:58)  HR: 62 (04 May 2018 04:45) (53 - 142)  BP: 95/60 (04 May 2018 07:50) (95/60 - 132/93)  BP(mean): --  ABP: --  ABP(mean): --  RR: 20 (04 May 2018 01:41) (17 - 20)  SpO2: 91% (04 May 2018 04:45) (91% - 99%)      Physical Exam:  General - lethargic obese  Neuro pupils reactive, moves upper extremitity, lethargic, not following commands  cv irreg irreg  abdomen soft  ext - 2+ edema                             13.7   20.37 )-----------( 176      ( 04 May 2018 06:42 )             42.8       05-04    144  |  109<H>  |  66<H>  ----------------------------<  163<H>  5.0   |  25  |  1.90<H>    Ca    9.3      04 May 2018 06:42              ABG - ( 04 May 2018 09:49 )  pH, Arterial: 7.42  pH, Blood: x     /  pCO2: 34    /  pO2: 181   / HCO3: 21    / Base Excess: -2.1  /  SaO2: 96            DVT Prophylaxis:   IV heparin                                                              Advanced Directives: code

## 2018-05-05 NOTE — PROVIDER CONTACT NOTE (OTHER) - NAME OF MD/NP/PA/DO NOTIFIED:
Consult called in for Dr. Rivera. DANA Rice at service
Dr. Cronin
Dr. White
MD Jaime
Ostomy Nurse ext [phone]
Dr Castillo

## 2018-05-05 NOTE — PROVIDER CONTACT NOTE (OTHER) - DATE AND TIME:
29-Apr-2018 15:17
04-May-2018 00:21
05-May-2018 08:18
05-May-2018 08:20
05-May-2018 09:04
30-Apr-2018 21:39

## 2018-05-05 NOTE — PROCEDURE NOTE - NSPROCDETAILS_GEN_ALL_CORE
patient difficulty passing 8/patient pre-oxygenated, tube inserted, placement confirmed
patient pre-oxygenated, tube inserted, placement confirmed
guidewire recovered/sterile technique, catheter placed/lumen(s) aspirated and flushed/sterile dressing applied

## 2018-05-05 NOTE — DISCHARGE NOTE FOR THE EXPIRED PATIENT - SECONDARY DIAGNOSIS.
CHF (congestive heart failure), NYHA class I, acute, systolic Chronic atrial fibrillation HTN (hypertension), benign Rapid atrial fibrillation

## 2018-05-05 NOTE — PROVIDER CONTACT NOTE (OTHER) - REASON
Consult- Cardiac Electrophysiologist
Consult- Neurology
Consult- Ostomy Nurse
Nephrology
Rapid a-fib sustaining in 140s.
cardio consult

## 2018-05-05 NOTE — DISCHARGE NOTE FOR THE EXPIRED PATIENT - HOSPITAL COURSE
78 y/o male w. PMHx of ?CHF, COPD, chr Afib , HTN, presented to the ED with progressively worsening SOB .      Patient had cardiac catheterizaion showing LAD lesion was going to be transferred [facility] for intervention       RRT for patient minimally responsive, abg did not show co2 retention on 5/40, MRI showed no stroke        Patient intubated for airway protection, had seizures overnight  PAtient developed hepatic encephalopathy from acute liver failure.  The family requested to withdraw care in accordance with the patients known prior wishes.  Passed at 15:22 with family at bedside

## 2018-05-05 NOTE — CONSULT NOTE ADULT - ASSESSMENT
78 y/o male w. PMHx of ?CHF, COPD, chr Afib (not on AC) HTN, admitted on 4/28 with SOB, leg swelling, had gained almost 30 lbs over couple of weeks, had cardiac catheterizaion showing LAD lesion was awaiting transf to [facility] for intervention. On 5/4/18 RR called as patient was unresponsive, MRI/A brain done showed no acute infarct/stenosis, pt was intubated for airway protection, he has been noted to have facial twitching and intermittent upper extremity twitches, has been noted to have elevated lactate, Troponins, is hypotensive, on pressors.    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

## 2018-05-05 NOTE — PROGRESS NOTE ADULT - ASSESSMENT
78 y/o male w. PMHx of ?CHF, COPD, chr Afib not on AC due to GI bleed and hemoptysis, HTN, presented to the ED with progressively worsening SOB over the past couple of weeks. As per patient, his SOB progressively worsening and gradually gaining weight with leg swelling. He gained almost 30 lbs over couple of weeks.     1. Acute CHF exacerbation- pt is a poor historian. LV fxn now 35-40%. Pt now agreeable and will have LHC/RHC today to further assess coronary anatomy. Keep NPO for now. Would reduce lasix as Cr continues to escalate. Repeat CXR today. Daily weights, fluid restrictions. Strict I/Os. Likely poor dietary compliance as well. Cont Bbl, statin. Labs pending today.    2. Chronic atrial fibrillation- presently rate controlled on tele. Cont Bbl- will titrate as needed. As per hospitalist note, patient was on xarelto in the past, off now due to GI bleed and hemoptysis. Can continue ASA for some CVA proph.    3. HTN- controlled, cont current meds.    4. Dyslipidemia- cont statin, lipid panel pending.     5. DVT proph, OOB as tolerated.      6. Keep NPO for LHC (depending on today's labs).
1. Acute CHF exacerbation-   He does not have symptoms at rest.   Continue toprol XL.   His BP borderline low today.  Will hold off on starting ACEI prior to anticipated PCI.     CAD- reportedly the left heart cath showed LAD stenosis and plan was to contemplate PCI at tertiary center.  Please f/u left heart cath report.     2. Chronic atrial fibrillation-   Rate controlled.  Xarelto on hold in anticipation for PCI.  Continue ASA and Plavix.     3. HTN- controlled, cont current meds.    4. Dyslipidemia- cont statin, lipid panel pending.     5. DVT proph, OOB as tolerated.
1. Patient admitted with chf, rrt called for altered mental status, on heparin, aspirin, plavix     for afib.     lethargic but non focal     also had recent cath, has acute renal failure creatinine 1.9 today, was normal day before  will get abg to r/o co2 retention  will intubated for airway protection  stat head ct  hold sedatives, unitl following commands, r/o stroke, cant do cta given acute renal failure
1. Patient with known LAD lesion awaiting cath developed dec mental status, now seizures     now shock , dec ef,  no clear source infection     renal failure    will place central line  d/c phenylephrine, start levophed  check troponin  hydartion  prn ativan, added keppra for seizures, eeg neurology consult was ordered yesterday  hematuria, garcia, monitor urine output  check repeat lactate, if not improved will consider ct abdomen pelvis  empiric abx. but not clear if any sepsis,
76 y/o male w. PMHx of ?CHF, COPD, chr Afib not on AC due to GI bleed and hemoptysis, HTN, presented to the ED with progressively worsening SOB over the past couple of weeks. As per patient, his SOB progressively worsening and gradually gaining weight with leg swelling. He gained almost 30 lbs over couple of weeks.     1. Acute CHF exacerbation- pt is a poor historian. LV fxn now 35-40%. LHC showing LAD with critical stenosis. Pt lethargic/obtunded today- may be unstable for transfer today. Hold lasix with Cr 1.9 today. Daily weights, fluid restrictions. Strict I/Os. Likely poor dietary compliance as well. Cont Bbl, statin.     2. Chronic atrial fibrillation- not well rate controlled. Cont Bbl, will add digoxin (renally dosed) as BP now borderline. Pt has a h/o GI bleed as per note- but is a poor historian as to the extent and timing. Now on IV heparin- and Hgb stable. Pt also on asa/plavix in setting of CAD.     3. HTN- controlled, cont current meds.    4. Dyslipidemia- cont statin, lipid panel pending.     5. DVT proph, OOB as tolerated.      6. Leukocytosis- likely due to steroids.     7. Lethargy- may need neuro, intensivist eval if condition worsens.
76 y/o male w. PMHx of ?CHF, COPD, chr Afib not on AC due to GI bleed and hemoptysis, HTN, presented to the ED with progressively worsening SOB over the past couple of weeks. As per patient, his SOB progressively worsening and gradually gaining weight with leg swelling. He gained almost 30 lbs over couple of weeks.     1. Acute CHF exacerbation- pt is a poor historian. LV fxn now 35-40%. Pt will need LHC to further assess coronary anatomy. Pt would like to "think about it." Keep NPO after MN for now. Would reduce lasix as Cr continues to escalate. Repeat CXR today. Daily weights, fluid restrictions. Strict I/Os. Likely poor dietary compliance as well. Cont Bbl, statin.     2. Chronic atrial fibrillation- presently rate controlled on tele. Cont Bbl- will titrate as needed. As per hospitalist note, patient was on xarelto in the past, off now due to GI bleed and hemoptysis. Can continue ASA for some CVA proph.    3. HTN- controlled, cont current meds.    4. Dyslipidemia- cont statin, lipid panel pending.     5. DVT proph, OOB as tolerated.      6. Keep NPO after MN for possible LHC.
78 y/o male w. PMHx of ?CHF, COPD, chr Afib not on AC due to GI bleed and hemoptysis, HTN, presented to the ED with progressively worsening SOB over the past couple of weeks. As per patient, his SOB progressively worsening and gradually gaining weight with leg swelling. He gained almost 30 lbs over couple of weeks.     1. Acute CHF exacerbation- pt is a poor historian with poor medical f/u and unknown hx.LV fxn now 35-40% on recent echo. LHC showing LAD with critical stenosis- will need eventual tx to tertiary care facility if condition improves.     2. Hypotension- unknown etiology. Possible sepsis? Cont empiric abx. Cont pressors, CCU monitoring. Gentle IVF in setting of reduced LVEF. Hold on all antihypertensives. Will repeat echo to reevaluate LV fxn.     3. Lactic acidosis- unknown etiology- possible ischemic bowel- check CT A/P. Possible sepsis- cont abx. Cx pending.      4. Chronic atrial fibrillation- not well rate controlled. Bbl now held with hypotension. Cont digoxin (renally dosed). IV heparin- now stopped due to reduced Hgb. Pt also on asa/plavix in setting of CAD.     5. Dyslipidemia- cont statin, lipid panel pending.     6. Seizure- neuro following. EEG pending.     7. Hypernatremia- start D5W. Like elevated in setting of previous NS.

## 2018-05-05 NOTE — CONSULT NOTE ADULT - SUBJECTIVE AND OBJECTIVE BOX
76 y/o male w. PMHx of ?CHF, COPD, chr Afib , HTN, presented to the ED with progressively worsening SOB .   Patient had cardiac catheterizaion showing LAD lesion was going to be transferred [facility] for intervention,    RRT for patient minimally responsive, abg did not show co2 retention on 5/40, MRI showed no stroke.  Patient intubated for airway protection, had seizures overnight  developed hematuria, decreased urine output, increased  lactate and now seizing again.   Renal eval called ashlee in setting of shock     Unresponsive on vent and having seizures now   per son pt is active drinker - daily   bs 31 - - advised staff - D50 being pushed now     PAST MEDICAL & SURGICAL HISTORY:  Chronic atrial fibrillation  HTN (hypertension), benign  No significant past surgical history      MEDICATIONS  (STANDING):  aspirin 325 milliGRAM(s) Oral daily  cefepime  Injectable.      clopidogrel Tablet 75 milliGRAM(s) Oral daily  dextrose 10%. 1000 milliLiter(s) (50 mL/Hr) IV Continuous <Continuous>  digoxin     Tablet 0.125 milliGRAM(s) Oral every other day  hydrocortisone sodium succinate Injectable 50 milliGRAM(s) IV Push every 8 hours  levETIRAcetam  IVPB 750 milliGRAM(s) IV Intermittent every 12 hours  LORazepam   Injectable 1 milliGRAM(s) IV Push daily  LORazepam   Injectable 2 milliGRAM(s) IV Push once  metoprolol succinate ER 75 milliGRAM(s) Oral daily  norepinephrine Infusion 0.2 MICROgram(s)/kG/Min (56.288 mL/Hr) IV Continuous <Continuous>  phenylephrine    Infusion 0.5 MICROgram(s)/kG/Min (28.144 mL/Hr) IV Continuous <Continuous>  simvastatin 40 milliGRAM(s) Oral at bedtime  sodium chloride 0.45%. 1000 milliLiter(s) (100 mL/Hr) IV Continuous <Continuous>  sodium chloride 0.9% Bolus 250 milliLiter(s) IV Bolus once  vancomycin  IVPB 1000 milliGRAM(s) IV Intermittent once      Allergies    No Known Allergies    Intolerances      SOCIAL HISTORY:  per son pt is active ETOH use - daily        FAMILY HISTORY:  No pertinent family history in first degree relatives      REVIEW OF SYSTEMS:  intubated - UTO .    Vital Signs Last 24 Hrs  T(C): 37.3 (05 May 2018 06:39), Max: 37.3 (05 May 2018 06:39)  T(F): 99.2 (05 May 2018 06:39), Max: 99.2 (05 May 2018 06:39)  HR: 108 (05 May 2018 08:55) (99 - 120)  BP: 83/50 (05 May 2018 06:30) (79/39 - 105/53)  BP(mean): 58 (05 May 2018 06:30) (49 - 86)  RR: 22 (05 May 2018 06:30) (19 - 42)  SpO2: 97% (05 May 2018 08:55) (77% - 100%)    I&O's Summary    04 May 2018 07:01  -  05 May 2018 07:00  --------------------------------------------------------  IN: 2525 mL / OUT: 2025 mL / NET: 500 mL    - I&O's Detail    04 May 2018 07:01  -  05 May 2018 07:00  --------------------------------------------------------  IN:    phenylephrine   Infusion: 450 mL    sodium chloride 0.45%.: 1075 mL    Sodium Chloride 0.9% IV Bolus: 1000 mL  Total IN: 2525 mL    OUT:    Indwelling Catheter - Urethral: 1175 mL    Nasoenteral Tube: 600 mL    Stool: 250 mL  Total OUT: 2025 mL    Total NET: 500 mL      PHYSICAL EXAM:    Constitutional: NAD  HEENT:  EOMI,  MMM  Neck: No LAD, No JVD  Respiratory: CTAB  Cardiovascular: S1 and S2  Gastrointestinal: BS+, soft, NT/ND  Extremities:  ++ peripheral edema  Neurological: intubated   : + garcia - w minimal uop   Skin: No rashes  Access: Not applicable    LABS:                                   10.7   7.51  )-----------( 136      ( 05 May 2018 05:33 )             35.5                         12.9   18.45 )-----------( 168      ( 04 May 2018 13:10 )             39.8     05-05    154    |  124    |  71     ----------------------------<  51        05 May 2018 05:33  3.7     |  16     |  2.01     148    |  111    |  80     ----------------------------<  136       04 May 2018 13:10  5.2     |  22     |  2.12     144    |  109    |  66     ----------------------------<  163       04 May 2018 06:42  5.0     |  25     |  1.90     Ca    5.2        05 May 2018 05:33  Ca    8.5        04 May 2018 13:10    Albumin, Serum: 3.2 g/dL (04.28.18 @ 19:58)    RADIOLOGY & ADDITIONAL STUDIES:    PROCEDURE DATE:  05/05/2018          INTERPRETATION:  History: Status post central line insertion.    Single view the chest was performed.    Comparison is made to prior study from April 4, 2018.    Findings:    Patient is status post intubation with ET tube 1 vertebral body above the   mariluz. There is an NG tube in place with tip in the stomach. There is a   new right-sided internal jugular central venous catheter in place with   tip in the SVC. There is no pneumothorax. There is mild improvement of   previously noted right basilar airspace opacity. There is opacity in the   left lower lung field consistent with a combination of partial   atelectasis, effusion and/or pneumonia. Heart size is mildly enlarged.    Impression:    Interval placement of a right internal jugular central venous catheter   with tip in the SVC. No evidence of pneumothorax. Mild improvement of   right basilar airspace opacity. Other findings grossly unchanged.

## 2018-05-05 NOTE — PROVIDER CONTACT NOTE (OTHER) - SITUATION
CHF  Service aware
A-fib 140s, with "gas pain." Reports no appetite today, states last BM was 4 days ago. Abdomen rounded, states baseline, non rigid, reports minimal pain on palpation.
Notified Dr. Cronin office regarding consult spoke with Codie from answering service.
Notified Dr. White office regarding consult spoke with answering service.
message left on Ostomy Nurse answering machine regarding consult.

## 2018-05-06 LAB
-  CANDIDA ALBICANS: SIGNIFICANT CHANGE UP
-  CANDIDA GLABRATA: SIGNIFICANT CHANGE UP
-  CANDIDA KRUSEI: SIGNIFICANT CHANGE UP
-  CANDIDA PARAPSILOSIS: SIGNIFICANT CHANGE UP
-  CANDIDA TROPICALIS: SIGNIFICANT CHANGE UP
-  COAGULASE NEGATIVE STAPHYLOCOCCUS: SIGNIFICANT CHANGE UP
-  K. PNEUMONIAE GROUP: SIGNIFICANT CHANGE UP
-  KPC RESISTANCE GENE: SIGNIFICANT CHANGE UP
-  STREPTOCOCCUS SP. (NOT GRP A, B OR S PNEUMONIAE): SIGNIFICANT CHANGE UP
A BAUMANNII DNA SPEC QL NAA+PROBE: SIGNIFICANT CHANGE UP
E CLOAC COMP DNA BLD POS QL NAA+PROBE: SIGNIFICANT CHANGE UP
E COLI DNA BLD POS QL NAA+NON-PROBE: SIGNIFICANT CHANGE UP
ENTEROCOC DNA BLD POS QL NAA+NON-PROBE: SIGNIFICANT CHANGE UP
ENTEROCOC DNA BLD POS QL NAA+NON-PROBE: SIGNIFICANT CHANGE UP
GP B STREP DNA BLD POS QL NAA+NON-PROBE: SIGNIFICANT CHANGE UP
GRAM STN FLD: SIGNIFICANT CHANGE UP
HAEM INFLU DNA BLD POS QL NAA+NON-PROBE: SIGNIFICANT CHANGE UP
K OXYTOCA DNA BLD POS QL NAA+NON-PROBE: SIGNIFICANT CHANGE UP
L MONOCYTOG DNA BLD POS QL NAA+NON-PROBE: SIGNIFICANT CHANGE UP
METHOD TYPE: SIGNIFICANT CHANGE UP
MRSA SPEC QL CULT: SIGNIFICANT CHANGE UP
MSSA DNA SPEC QL NAA+PROBE: SIGNIFICANT CHANGE UP
N MEN ISLT CULT: SIGNIFICANT CHANGE UP
P AERUGINOSA DNA BLD POS NAA+NON-PROBE: SIGNIFICANT CHANGE UP
PROTEUS SP DNA BLD POS QL NAA+NON-PROBE: SIGNIFICANT CHANGE UP
S MARCESCENS DNA BLD POS NAA+NON-PROBE: SIGNIFICANT CHANGE UP
S PNEUM DNA BLD POS QL NAA+NON-PROBE: SIGNIFICANT CHANGE UP
S PYO DNA BLD POS QL NAA+NON-PROBE: SIGNIFICANT CHANGE UP

## 2018-05-21 PROBLEM — I10 HYPERTENSION: Status: ACTIVE | Noted: 2018-04-23

## 2018-05-21 PROBLEM — I48.91 AFIB: Status: ACTIVE | Noted: 2018-05-21

## 2018-05-21 PROBLEM — R60.0 BILATERAL LEG EDEMA: Status: ACTIVE | Noted: 2018-04-23

## 2018-05-21 PROBLEM — I50.9 CHF (CONGESTIVE HEART FAILURE): Status: ACTIVE | Noted: 2018-04-23

## 2018-06-13 LAB
CULTURE RESULTS: SIGNIFICANT CHANGE UP
ORGANISM # SPEC MICROSCOPIC CNT: SIGNIFICANT CHANGE UP
ORGANISM # SPEC MICROSCOPIC CNT: SIGNIFICANT CHANGE UP
SPECIMEN SOURCE: SIGNIFICANT CHANGE UP

## 2019-06-28 NOTE — ED ADULT NURSE NOTE - NSSISCREENINGQ5_ED_A_ED
Doxycycline Pregnancy And Lactation Text: This medication is Pregnancy Category D and not consider safe during pregnancy. It is also excreted in breast milk but is considered safe for shorter treatment courses. Benzoyl Peroxide Pregnancy And Lactation Text: This medication is Pregnancy Category C. It is unknown if benzoyl peroxide is excreted in breast milk. Birth Control Pills Counseling: Birth Control Pill Counseling: I discussed with the patient the potential side effects of OCPs including but not limited to increased risk of stroke, heart attack, thrombophlebitis, deep venous thrombosis, hepatic adenomas, breast changes, GI upset, headaches, and depression.  The patient verbalized understanding of the proper use and possible adverse effects of OCPs. All of the patient's questions and concerns were addressed. Include Pregnancy/Lactation Warning?: No Spironolactone Counseling: Patient advised regarding risks of diarrhea, abdominal pain, hyperkalemia, birth defects (for female patients), liver toxicity and renal toxicity. The patient may need blood work to monitor liver and kidney function and potassium levels while on therapy. The patient verbalized understanding of the proper use and possible adverse effects of spironolactone.  All of the patient's questions and concerns were addressed. High Dose Vitamin A Counseling: Side effects reviewed, pt to contact office should one occur. Topical Clindamycin Counseling: Patient counseled that this medication may cause skin irritation or allergic reactions.  In the event of skin irritation, the patient was advised to reduce the amount of the drug applied or use it less frequently.   The patient verbalized understanding of the proper use and possible adverse effects of clindamycin.  All of the patient's questions and concerns were addressed. Isotretinoin Pregnancy And Lactation Text: This medication is Pregnancy Category X and is considered extremely dangerous during pregnancy. It is unknown if it is excreted in breast milk. No Tazorac Pregnancy And Lactation Text: This medication is not safe during pregnancy. It is unknown if this medication is excreted in breast milk. High Dose Vitamin A Pregnancy And Lactation Text: High dose vitamin A therapy is contraindicated during pregnancy and breast feeding. Topical Clindamycin Pregnancy And Lactation Text: This medication is Pregnancy Category B and is considered safe during pregnancy. It is unknown if it is excreted in breast milk. Topical Retinoid counseling:  Patient advised to apply a pea-sized amount only at bedtime and wait 30 minutes after washing their face before applying.  If too drying, patient may add a non-comedogenic moisturizer. The patient verbalized understanding of the proper use and possible adverse effects of retinoids.  All of the patient's questions and concerns were addressed. Erythromycin Counseling:  I discussed with the patient the risks of erythromycin including but not limited to GI upset, allergic reaction, drug rash, diarrhea, increase in liver enzymes, and yeast infections. Spironolactone Pregnancy And Lactation Text: This medication can cause feminization of the male fetus and should be avoided during pregnancy. The active metabolite is also found in breast milk. Birth Control Pills Pregnancy And Lactation Text: This medication should be avoided if pregnant and for the first 30 days post-partum. Azithromycin Counseling:  I discussed with the patient the risks of azithromycin including but not limited to GI upset, allergic reaction, drug rash, diarrhea, and yeast infections. Topical Sulfur Applications Counseling: Topical Sulfur Counseling: Patient counseled that this medication may cause skin irritation or allergic reactions.  In the event of skin irritation, the patient was advised to reduce the amount of the drug applied or use it less frequently.   The patient verbalized understanding of the proper use and possible adverse effects of topical sulfur application.  All of the patient's questions and concerns were addressed. Minocycline Counseling: Patient advised regarding possible photosensitivity and discoloration of the teeth, skin, lips, tongue and gums.  Patient instructed to avoid sunlight, if possible.  When exposed to sunlight, patients should wear protective clothing, sunglasses, and sunscreen.  The patient was instructed to call the office immediately if the following severe adverse effects occur:  hearing changes, easy bruising/bleeding, severe headache, or vision changes.  The patient verbalized understanding of the proper use and possible adverse effects of minocycline.  All of the patient's questions and concerns were addressed. Bactrim Counseling:  I discussed with the patient the risks of sulfa antibiotics including but not limited to GI upset, allergic reaction, drug rash, diarrhea, dizziness, photosensitivity, and yeast infections.  Rarely, more serious reactions can occur including but not limited to aplastic anemia, agranulocytosis, methemoglobinemia, blood dyscrasias, liver or kidney failure, lung infiltrates or desquamative/blistering drug rashes. Patient Specific Counseling (Will Not Stick From Patient To Patient): Recommend panoxyl lather and let it stand on skin for 5 min\\nPanoxyl will bleach towels Detail Level: Zone Tetracycline Counseling: Patient counseled regarding possible photosensitivity and increased risk for sunburn.  Patient instructed to avoid sunlight, if possible.  When exposed to sunlight, patients should wear protective clothing, sunglasses, and sunscreen.  The patient was instructed to call the office immediately if the following severe adverse effects occur:  hearing changes, easy bruising/bleeding, severe headache, or vision changes.  The patient verbalized understanding of the proper use and possible adverse effects of tetracycline.  All of the patient's questions and concerns were addressed. Patient understands to avoid pregnancy while on therapy due to potential birth defects. Azithromycin Pregnancy And Lactation Text: This medication is considered safe during pregnancy and is also secreted in breast milk. Erythromycin Pregnancy And Lactation Text: This medication is Pregnancy Category B and is considered safe during pregnancy. It is also excreted in breast milk. Topical Retinoid Pregnancy And Lactation Text: This medication is Pregnancy Category C. It is unknown if this medication is excreted in breast milk. Dapsone Counseling: I discussed with the patient the risks of dapsone including but not limited to hemolytic anemia, agranulocytosis, rashes, methemoglobinemia, kidney failure, peripheral neuropathy, headaches, GI upset, and liver toxicity.  Patients who start dapsone require monitoring including baseline LFTs and weekly CBCs for the first month, then every month thereafter.  The patient verbalized understanding of the proper use and possible adverse effects of dapsone.  All of the patient's questions and concerns were addressed. Doxycycline Counseling:  Patient counseled regarding possible photosensitivity and increased risk for sunburn.  Patient instructed to avoid sunlight, if possible.  When exposed to sunlight, patients should wear protective clothing, sunglasses, and sunscreen.  The patient was instructed to call the office immediately if the following severe adverse effects occur:  hearing changes, easy bruising/bleeding, severe headache, or vision changes.  The patient verbalized understanding of the proper use and possible adverse effects of doxycycline.  All of the patient's questions and concerns were addressed. Benzoyl Peroxide Counseling: Patient counseled that medicine may cause skin irritation and bleach clothing.  In the event of skin irritation, the patient was advised to reduce the amount of the drug applied or use it less frequently.   The patient verbalized understanding of the proper use and possible adverse effects of benzoyl peroxide.  All of the patient's questions and concerns were addressed. Minocycline Pregnancy And Lactation Text: This medication is Pregnancy Category D and not consider safe during pregnancy. It is also excreted in breast milk. Bactrim Pregnancy And Lactation Text: This medication is Pregnancy Category D and is known to cause fetal risk.  It is also excreted in breast milk. Isotretinoin Counseling: Patient should get monthly blood tests, not donate blood, not drive at night if vision affected, not share medication, and not undergo elective surgery for 6 months after tx completed. Side effects reviewed, pt to contact office should one occur. Tazorac Counseling:  Patient advised that medication is irritating and drying.  Patient may need to apply sparingly and wash off after an hour before eventually leaving it on overnight.  The patient verbalized understanding of the proper use and possible adverse effects of tazorac.  All of the patient's questions and concerns were addressed. Dapsone Pregnancy And Lactation Text: This medication is Pregnancy Category C and is not considered safe during pregnancy or breast feeding. Topical Sulfur Applications Pregnancy And Lactation Text: This medication is Pregnancy Category C and has an unknown safety profile during pregnancy. It is unknown if this topical medication is excreted in breast milk.

## 2020-02-13 NOTE — ED PROVIDER NOTE - PSYCHIATRIC, MLM
Neurology Alert and oriented to person, place, time/situation. normal mood and affect. no apparent risk to self or others.

## 2020-05-08 NOTE — ED ADULT NURSE NOTE - DOES PATIENT HAVE ADVANCE DIRECTIVE
May 14, 2020       No Recipients      Patient: Warren Rico   YOB: 1965   Date of Visit: 5/8/2020       Dear  No Recipients:    I saw your patient, Warren Rico, for an evaluation. Below are my notes for this visit with him.    If you have questions, please do not hesitate to call me.          Sincerely,        Palak Richardson PA-C        CC: No Recipients  Palak Richardson PA-C  5/8/2020 12:19 PM  Sign when Signing Visit  Depression - stopped Cymbalta, dizziness increased when increased dose.  Stopped suddenly.  No longer feeling dizzy.  Depression  Few weeks then stopped.     Tried meloxicam - caused stomach pain.     Insomnia - trouble falling asleep, unable to stay asleep.  Tried OTC products without improvement.     Overall frustrated with situation and admits doesn't follow up do to this.      Allergy - ongoing, worsening over the past couple of months.  Taking nothing, no taking antihistamine, has not worked before.  Flonase.  Eyes itching.  Tearing and crusting.      Tongue - increased pain few weeks, white patches on tongue, before that.  Needs something to fix this long term.     T2DM - BS high 100s and 200s.  Stopped Victoza,     Increasing water intake.      Putting weight back on.       Last four PHQ 2/9 Test Results  0: Not at all  1: Several days  2: More than half the days  3: Nearly every day       PHQ9 SCREENING FLOWSHEET 2/28/2020 10/28/2019 8/23/2019 8/9/2019   Adult PHQ2 Score 6 6 3 3   Adult PHQ9 Score 21 20 18 16   Little interest or pleasure in activity 3 3 1 2   Feeling down, depressed or hopeless 3 3 2 1   Trouble falling or staying asleep or sleeping all the time 3 3 3 3   Feeling tired or having little energy 3 3 3 2   Poor appetite or overeating 2 3 3 2   Feeling bad about yourself or that you are a failure or have let yourself or family down 3 0 2 1   Trouble concentrating on things such as reading hte newspaper or watching TV 2 2 2 3   Moving or speaking slowly that other  people have noticed or the opposite - being so fidgety or restless that you have been moving around a lot more than usual 2 3 2 2   Thoughts that you would be better off dead or of hurting yourself in some way 0 0 0 0                  Yes

## 2023-02-16 NOTE — ED STATDOCS - SCRIBE NAME
Subjective:        Patient ID: Johana Busby is a 79 y o  male  Chief Complaint:  Carito Anthony is here for routine follow-up, on status post 3 rounds of antibiotics for pneumonia, he is finally feeling better  No cough or fever  Denies any chest pain palpitations presyncope syncope TIA or claudication-like symptoms  No edema orthopnea or PND    The following portions of the patient's history were reviewed and updated as appropriate: allergies, current medications, past family history, past medical history, past social history, past surgical history and problem list   Review of Systems   Constitutional: Negative for chills, diaphoresis, malaise/fatigue and weight gain  HENT: Negative for nosebleeds and stridor  Eyes: Negative for double vision, vision loss in left eye, vision loss in right eye and visual disturbance  Cardiovascular: Negative for chest pain, claudication, cyanosis, dyspnea on exertion, irregular heartbeat, leg swelling, near-syncope, orthopnea, palpitations, paroxysmal nocturnal dyspnea and syncope  Respiratory: Negative for cough, shortness of breath, snoring and wheezing  Endocrine: Negative for polydipsia, polyphagia and polyuria  Hematologic/Lymphatic: Negative for bleeding problem  Does not bruise/bleed easily  Skin: Negative for flushing and rash  Musculoskeletal: Negative for falls and myalgias  Gastrointestinal: Negative for abdominal pain, heartburn, hematemesis, hematochezia, melena and nausea  Genitourinary: Negative for hematuria  Neurological: Negative for brief paralysis, dizziness, focal weakness, headaches, light-headedness, loss of balance and vertigo  Psychiatric/Behavioral: Negative for altered mental status and substance abuse  Allergic/Immunologic: Negative for hives            Objective:      /60 (BP Location: Left arm, Patient Position: Sitting)   Pulse 82   Ht 6' 1" (1 854 m)   Wt 103 kg (226 lb)   BMI 29 82 kg/m²   Physical Exam  Constitutional:       General: He is not in acute distress  Appearance: He is well-developed  He is not diaphoretic  HENT:      Head: Normocephalic and atraumatic  Eyes:      General: No scleral icterus  Pupils: Pupils are equal, round, and reactive to light  Neck:      Thyroid: No thyromegaly  Vascular: No carotid bruit or JVD  Cardiovascular:      Rate and Rhythm: Normal rate and regular rhythm  Heart sounds: Normal heart sounds  No murmur heard  No friction rub  No gallop  Pulmonary:      Effort: Pulmonary effort is normal  No respiratory distress  Breath sounds: Normal breath sounds  No stridor  No wheezing or rales  Abdominal:      General: Bowel sounds are normal  There is no distension  Palpations: Abdomen is soft  There is no mass  Tenderness: There is no abdominal tenderness  Musculoskeletal:         General: No deformity  Normal range of motion  Cervical back: Normal range of motion and neck supple  Right lower leg: No edema  Left lower leg: No edema  Skin:     General: Skin is warm and dry  Coloration: Skin is not pale  Findings: No erythema  Neurological:      Mental Status: He is alert and oriented to person, place, and time  Coordination: Coordination normal    Psychiatric:         Mood and Affect: Mood normal          Behavior: Behavior normal          Thought Content:  Thought content normal          Judgment: Judgment normal          Lab Review:   Ancillary Orders on 01/20/2023   Component Date Value   • Protime 01/23/2023 29 2 (H)    • INR 01/23/2023 2 73 (H)    Appointment on 01/14/2023   Component Date Value   • WBC 01/14/2023 9 19    • RBC 01/14/2023 4 51    • Hemoglobin 01/14/2023 13 2    • Hematocrit 01/14/2023 40 8    • MCV 01/14/2023 91    • MCH 01/14/2023 29 3    • MCHC 01/14/2023 32 4    • RDW 01/14/2023 13 2    • MPV 01/14/2023 10 3    • Platelets 33/31/1104 258    • nRBC 01/14/2023 0    • Neutrophils Relative 01/14/2023 74    • Immat GRANS % 01/14/2023 0    • Lymphocytes Relative 01/14/2023 12 (L)    • Monocytes Relative 01/14/2023 10    • Eosinophils Relative 01/14/2023 3    • Basophils Relative 01/14/2023 1    • Neutrophils Absolute 01/14/2023 6 85    • Immature Grans Absolute 01/14/2023 0 03    • Lymphocytes Absolute 01/14/2023 1 07    • Monocytes Absolute 01/14/2023 0 88    • Eosinophils Absolute 01/14/2023 0 29    • Basophils Absolute 01/14/2023 0 07    • Sodium 01/14/2023 139    • Potassium 01/14/2023 4 4    • Chloride 01/14/2023 105    • CO2 01/14/2023 25    • ANION GAP 01/14/2023 9    • BUN 01/14/2023 21    • Creatinine 01/14/2023 1 14    • Glucose, Fasting 01/14/2023 161 (H)    • Calcium 01/14/2023 8 5    • Corrected Calcium 01/14/2023 9 2    • AST 01/14/2023 10    • ALT 01/14/2023 17    • Alkaline Phosphatase 01/14/2023 66    • Total Protein 01/14/2023 6 9    • Albumin 01/14/2023 3 1 (L)    • Total Bilirubin 01/14/2023 0 77    • eGFR 01/14/2023 57    • Mycoplasma pneumo IgG 01/14/2023 226 (H)    • Mycoplasma pneumo IgM 01/14/2023 <770    Orders Only on 12/28/2022   Component Date Value   • Protime 12/29/2022 26 2 (H)    • INR 12/29/2022 2 38 (H)      No results found  Assessment:       1  Persistent atrial fibrillation (HCC)  POCT ECG      2  Chronic diastolic congestive heart failure ([facility] [address] )        3  Hypertensive heart disease with heart failure ([facility] [facility] )        4  Dyslipidemia associated with type 2 diabetes mellitus ([facility] )             Plan:       Flower Jones has no signs or symptoms reminiscent of angina heart failure nor electrical instability  A-fib is rate controlled he is properly anticoagulated  INR therapeutic, goal 2 0-3 0 recommended  Continue warfarin per INR and metoprolol succinate 50 mg twice daily  Blood pressure well controlled  He is euvolemic  Continue Lasix 20 mg 3 days a week  Lipids well controlled continue atorvastatin 40 mg daily  Cardiac auscultation unremarkable      I ordered no acute cardiac testing  Plan to see him back in 6 months, asked him to call sooner with any concerning potential cardiac symptoms in the meantime  Keith Gonzalez

## 2023-03-10 NOTE — PATIENT PROFILE ADULT. - WHEN FALL OCCURRED
Sherice TEMPLE PA-C  [facility] Clinical Pool 21 minutes ago (10:40 AM)       RX sent to [facility] thank you! last six months

## 2023-10-16 NOTE — ED PROVIDER NOTE - CPE EDP ENMT NORM
previous_patch_test_North_American_series Have You Had Previous Patch Testing In The Past?: North American series normal...

## 2024-05-08 NOTE — ED ADULT NURSE NOTE - NSHISCREENINGQ1_ED_A_ED
[facility]  Primary Care   [address] [facility]       Patient ID: Pranay Alas     Chief Complaint:   Chief Complaint   Patient presents with    Hospital Follow Up        HPI:  Follow-up from [facility] where he went almost 1 week ago for altered mental status.  In short, he was confused acutely 1 morning while he was in a meeting for work.  He remembers who was an taking notes but he does not remember the meeting.  His family was appropriately concerned so they brought to [facility] ER where it was stroke activation.  Thankfully he had no focal neurologic deficits.  His blood pressure on admit to the ER was a bit elevated.  He did take a 3rd of Cialis 20 mg pill that morning but his taken that numerous times in the past.  He was admitted overnight for further workup and thankfully CT scan of his head and CTA and an MRI failed to show any signs of a stroke.  Echocardiogram looked good.  Not sure if they did a carotid ultrasound and he does not remember 1.  Symptoms eventually resolved and he was discharged home and will actually see a private neurologist tomorrow.  He was discharged on low-dose aspirin and I agree with that for now.  The going theory is that this is all a side effect of the Cialis but he could have had a TIA.  I will allow Neurology to weigh in on that.  Patient wonders if Viagra would be a better option but it may not since it does have the same mechanism of action and if it was causing the transient neurologic symptoms due to low blood pressure, it may not be the best medicine for him.  Of note, he did have a headache in the morning that he would the mental status changes but again that usually occurs with Cialis.  There was talk of whether not this could be a complicated migraine.  Of note, his ammonia level was slightly elevated at 67, so going to repeat that level today.  He has no history of intrinsic liver disease that we know of.    Review of Systems        Mental status changes- Resolved     Objective:      Physical Exam   Physical Exam       Normal    Vitals:   Vitals:    05/08/24 1127   BP: 118/72   Pulse: 62   SpO2: 100%   Weight: 79.2 kg (174 lb 9.7 oz)        Assessment:           Plan:       Pranay Alas  was seen today for follow-up and may need lab work.    Diagnoses and all orders for this visit:    Pranay was seen today for hospital follow up.    Diagnoses and all orders for this visit:    Acute alteration in mental status  To see Neuro tomorrow   Recheck Ammonis level   Continue Aspirin   May have been a TIA vs. Cialis side effects     Erectile dysfunction due to diseases classified elsewhere  Avoid Cialis for now     Increased ammonia level  -     US Abdomen Limited; Future  -     AMMONIA; Future  Labs today Schedule Ultrasound      Visit today included increased complexity associated with the care of the episodic problem AMS, elevated ammonia    addressed and managing the longitudinal care of the patient due to the serious and/or complex managed problem(s) .      Kranthi Goodrich MD     No